# Patient Record
Sex: MALE | Race: WHITE | NOT HISPANIC OR LATINO
[De-identification: names, ages, dates, MRNs, and addresses within clinical notes are randomized per-mention and may not be internally consistent; named-entity substitution may affect disease eponyms.]

---

## 2017-03-13 ENCOUNTER — APPOINTMENT (OUTPATIENT)
Dept: INTERNAL MEDICINE | Facility: CLINIC | Age: 77
End: 2017-03-13

## 2017-08-01 ENCOUNTER — APPOINTMENT (OUTPATIENT)
Dept: INTERNAL MEDICINE | Facility: CLINIC | Age: 77
End: 2017-08-01
Payer: MEDICARE

## 2017-08-01 PROCEDURE — 99214 OFFICE O/P EST MOD 30 MIN: CPT

## 2017-08-24 ENCOUNTER — APPOINTMENT (OUTPATIENT)
Dept: GASTROENTEROLOGY | Facility: CLINIC | Age: 77
End: 2017-08-24
Payer: MEDICARE

## 2017-08-24 VITALS
RESPIRATION RATE: 16 BRPM | BODY MASS INDEX: 27.11 KG/M2 | OXYGEN SATURATION: 100 % | WEIGHT: 183 LBS | SYSTOLIC BLOOD PRESSURE: 129 MMHG | DIASTOLIC BLOOD PRESSURE: 79 MMHG | HEIGHT: 69 IN | HEART RATE: 62 BPM

## 2017-08-24 PROCEDURE — 99214 OFFICE O/P EST MOD 30 MIN: CPT

## 2017-09-01 ENCOUNTER — APPOINTMENT (OUTPATIENT)
Dept: INTERNAL MEDICINE | Facility: CLINIC | Age: 77
End: 2017-09-01
Payer: MEDICARE

## 2017-09-01 PROCEDURE — G0008: CPT

## 2017-09-01 PROCEDURE — 90686 IIV4 VACC NO PRSV 0.5 ML IM: CPT

## 2017-09-01 PROCEDURE — 99213 OFFICE O/P EST LOW 20 MIN: CPT | Mod: 25

## 2017-10-26 ENCOUNTER — APPOINTMENT (OUTPATIENT)
Dept: GASTROENTEROLOGY | Facility: GI CENTER | Age: 77
End: 2017-10-26

## 2017-12-19 ENCOUNTER — APPOINTMENT (OUTPATIENT)
Dept: INTERNAL MEDICINE | Facility: CLINIC | Age: 77
End: 2017-12-19
Payer: MEDICARE

## 2017-12-19 PROCEDURE — 99214 OFFICE O/P EST MOD 30 MIN: CPT | Mod: 25

## 2017-12-19 PROCEDURE — 36415 COLL VENOUS BLD VENIPUNCTURE: CPT

## 2018-03-14 ENCOUNTER — RX RENEWAL (OUTPATIENT)
Age: 78
End: 2018-03-14

## 2018-03-15 ENCOUNTER — APPOINTMENT (OUTPATIENT)
Dept: GASTROENTEROLOGY | Facility: GI CENTER | Age: 78
End: 2018-03-15
Payer: MEDICARE

## 2018-03-15 ENCOUNTER — OUTPATIENT (OUTPATIENT)
Dept: OUTPATIENT SERVICES | Facility: HOSPITAL | Age: 78
LOS: 1 days | Discharge: HOME | End: 2018-03-15
Payer: MEDICARE

## 2018-03-15 ENCOUNTER — RESULT REVIEW (OUTPATIENT)
Age: 78
End: 2018-03-15

## 2018-03-15 DIAGNOSIS — K50.10 CROHN'S DISEASE OF LARGE INTESTINE WITHOUT COMPLICATIONS: ICD-10-CM

## 2018-03-15 PROCEDURE — 88305 TISSUE EXAM BY PATHOLOGIST: CPT

## 2018-03-15 PROCEDURE — 88305 TISSUE EXAM BY PATHOLOGIST: CPT | Mod: 26

## 2018-03-15 PROCEDURE — 45380 COLONOSCOPY AND BIOPSY: CPT

## 2018-03-19 LAB — SURGICAL PATHOLOGY FINAL REPORT - CH: SIGNIFICANT CHANGE UP

## 2018-03-20 ENCOUNTER — RESULT REVIEW (OUTPATIENT)
Age: 78
End: 2018-03-20

## 2018-05-07 DIAGNOSIS — Z84.89 FAMILY HISTORY OF OTHER SPECIFIED CONDITIONS: ICD-10-CM

## 2018-05-07 DIAGNOSIS — Z87.891 PERSONAL HISTORY OF NICOTINE DEPENDENCE: ICD-10-CM

## 2018-05-07 DIAGNOSIS — Z78.9 OTHER SPECIFIED HEALTH STATUS: ICD-10-CM

## 2018-05-07 DIAGNOSIS — R42 DIZZINESS AND GIDDINESS: ICD-10-CM

## 2018-05-07 DIAGNOSIS — Z82.49 FAMILY HISTORY OF ISCHEMIC HEART DISEASE AND OTHER DISEASES OF THE CIRCULATORY SYSTEM: ICD-10-CM

## 2018-07-19 PROBLEM — Z78.9 CURRENT DRINKER OF ALCOHOL: Status: ACTIVE | Noted: 2018-07-19

## 2018-07-19 PROBLEM — Z84.89 FAMILY HISTORY OF PHLEBITIS: Status: ACTIVE | Noted: 2018-07-19

## 2018-07-19 PROBLEM — R42 VERTIGO: Status: ACTIVE | Noted: 2018-07-19

## 2018-07-19 PROBLEM — Z82.49 FAMILY HISTORY OF HYPERTENSION: Status: ACTIVE | Noted: 2018-07-19

## 2018-07-19 PROBLEM — Z87.891 FORMER SMOKER: Status: ACTIVE | Noted: 2018-07-19

## 2018-08-17 ENCOUNTER — LABORATORY RESULT (OUTPATIENT)
Age: 78
End: 2018-08-17

## 2018-08-17 ENCOUNTER — APPOINTMENT (OUTPATIENT)
Dept: INTERNAL MEDICINE | Facility: CLINIC | Age: 78
End: 2018-08-17
Payer: MEDICARE

## 2018-08-17 VITALS
BODY MASS INDEX: 30.21 KG/M2 | DIASTOLIC BLOOD PRESSURE: 60 MMHG | SYSTOLIC BLOOD PRESSURE: 140 MMHG | HEIGHT: 69 IN | WEIGHT: 204 LBS

## 2018-08-17 DIAGNOSIS — Z86.79 PERSONAL HISTORY OF OTHER DISEASES OF THE CIRCULATORY SYSTEM: ICD-10-CM

## 2018-08-17 DIAGNOSIS — Z86.39 PERSONAL HISTORY OF OTHER ENDOCRINE, NUTRITIONAL AND METABOLIC DISEASE: ICD-10-CM

## 2018-08-17 DIAGNOSIS — Z78.9 OTHER SPECIFIED HEALTH STATUS: ICD-10-CM

## 2018-08-17 PROCEDURE — 99214 OFFICE O/P EST MOD 30 MIN: CPT | Mod: 25

## 2018-08-17 PROCEDURE — 36415 COLL VENOUS BLD VENIPUNCTURE: CPT

## 2018-08-17 RX ORDER — POLYETHYLENE GLYOCOL 3350, SODIUM CHLORIDE, SODIUM BICARBONATE AND POTASSIUM CHLORIDE 420; 11.2; 5.72; 1.48 G/4L; G/4L; G/4L; G/4L
420 POWDER, FOR SOLUTION NASOGASTRIC; ORAL
Qty: 4000 | Refills: 0 | Status: DISCONTINUED | COMMUNITY
Start: 2018-03-14 | End: 2018-08-17

## 2018-08-17 RX ORDER — ASPIRIN 325 MG/1
325 TABLET, FILM COATED ORAL DAILY
Refills: 0 | Status: DISCONTINUED | COMMUNITY
End: 2018-08-17

## 2018-08-17 RX ORDER — SODIUM SULFATE, POTASSIUM SULFATE, MAGNESIUM SULFATE 17.5; 3.13; 1.6 G/ML; G/ML; G/ML
17.5-3.13-1.6 SOLUTION, CONCENTRATE ORAL
Qty: 1 | Refills: 0 | Status: DISCONTINUED | COMMUNITY
Start: 2017-08-24 | End: 2018-08-17

## 2018-08-17 NOTE — HISTORY OF PRESENT ILLNESS
[FreeTextEntry1] : check up on medical issues [de-identified] : Patient here for routine followup of his multiple medical problems. He has an indwelling monitor and had no obvious episodes A. fib remains on mellitus. He is concerned about his elevated TSH and vitamin D level which will be repeated.\par \par He has no complaints of chest pain shortness of breath or GI symptoms. He is compliant with his medication\par no complaints of chest pain

## 2018-08-17 NOTE — PLAN
[FreeTextEntry1] : Patient examined and adjustments to therapy for HBP not needed All labs reviewed with patient as well. Review of systems and physical exam discussed with patient. Care plan for future followups discussed with patient. All issues of health for the current year discussed in depth with patient who was conversant and all relevant issues addressed.\par A repeat TSH will be drawn to ensure that the patient does use thyroid. In addition vitamin D levels will be drawn and supplemented as he has a history of Crohn's and maybe malabsorb and period\par \par He is in normal sinus rhythm at the current time with normal blood pressure and normal physical exam.\par \par All issues regarding patient's health and medical problems have been discussed. The patient understands and concurs with the treatment plan.

## 2018-08-19 LAB
25(OH)D3 SERPL-MCNC: 25.3 NG/ML
ALBUMIN SERPL ELPH-MCNC: 4.5 G/DL
ALP BLD-CCNC: 47 U/L
ALT SERPL-CCNC: 45 U/L
ANION GAP SERPL CALC-SCNC: 12 MMOL/L
AST SERPL-CCNC: 32 U/L
BASOPHILS # BLD AUTO: 0.06 K/UL
BASOPHILS NFR BLD AUTO: 0.5 %
BILIRUB SERPL-MCNC: 0.4 MG/DL
BUN SERPL-MCNC: 26 MG/DL
CALCIUM SERPL-MCNC: 9.9 MG/DL
CHLORIDE SERPL-SCNC: 101 MMOL/L
CHOLEST SERPL-MCNC: 128 MG/DL
CHOLEST/HDLC SERPL: 3 RATIO
CO2 SERPL-SCNC: 27 MMOL/L
CREAT SERPL-MCNC: 1.07 MG/DL
EOSINOPHIL # BLD AUTO: 0.34 K/UL
EOSINOPHIL NFR BLD AUTO: 3.1 %
GLUCOSE SERPL-MCNC: 104 MG/DL
HCT VFR BLD CALC: 45.9 %
HDLC SERPL-MCNC: 43 MG/DL
HGB BLD-MCNC: 14.4 G/DL
IMM GRANULOCYTES NFR BLD AUTO: 0.6 %
LDLC SERPL CALC-MCNC: 51 MG/DL
LYMPHOCYTES # BLD AUTO: 3.02 K/UL
LYMPHOCYTES NFR BLD AUTO: 27.1 %
MAN DIFF?: NORMAL
MCHC RBC-ENTMCNC: 31 PG
MCHC RBC-ENTMCNC: 31.4 GM/DL
MCV RBC AUTO: 98.7 FL
MONOCYTES # BLD AUTO: 1.02 K/UL
MONOCYTES NFR BLD AUTO: 9.2 %
NEUTROPHILS # BLD AUTO: 6.62 K/UL
NEUTROPHILS NFR BLD AUTO: 59.5 %
PLATELET # BLD AUTO: 277 K/UL
POTASSIUM SERPL-SCNC: 5.3 MMOL/L
PROT SERPL-MCNC: 7.1 G/DL
RBC # BLD: 4.65 M/UL
RBC # FLD: 15.4 %
SODIUM SERPL-SCNC: 140 MMOL/L
T4 SERPL-MCNC: 8 UG/DL
TRIGL SERPL-MCNC: 171 MG/DL
TSH SERPL-ACNC: 5.15 UIU/ML
WBC # FLD AUTO: 11.13 K/UL

## 2018-08-20 ENCOUNTER — TRANSCRIPTION ENCOUNTER (OUTPATIENT)
Age: 78
End: 2018-08-20

## 2018-08-23 ENCOUNTER — TRANSCRIPTION ENCOUNTER (OUTPATIENT)
Age: 78
End: 2018-08-23

## 2018-11-12 ENCOUNTER — APPOINTMENT (OUTPATIENT)
Dept: INTERNAL MEDICINE | Facility: CLINIC | Age: 78
End: 2018-11-12
Payer: MEDICARE

## 2018-11-12 VITALS
SYSTOLIC BLOOD PRESSURE: 105 MMHG | HEIGHT: 69 IN | DIASTOLIC BLOOD PRESSURE: 55 MMHG | WEIGHT: 201 LBS | BODY MASS INDEX: 29.77 KG/M2

## 2018-11-12 DIAGNOSIS — I47.1 SUPRAVENTRICULAR TACHYCARDIA: ICD-10-CM

## 2018-11-12 DIAGNOSIS — R00.2 PALPITATIONS: ICD-10-CM

## 2018-11-12 PROCEDURE — 90662 IIV NO PRSV INCREASED AG IM: CPT

## 2018-11-12 PROCEDURE — 36415 COLL VENOUS BLD VENIPUNCTURE: CPT

## 2018-11-12 PROCEDURE — G0008: CPT

## 2018-11-12 PROCEDURE — 99214 OFFICE O/P EST MOD 30 MIN: CPT | Mod: 25

## 2018-11-12 NOTE — PLAN
[FreeTextEntry1] : Patient examined and adjustments to therapy for HBP not needed and 115/70 today with a heart rate of 55 All labs reviewed with patient as well. Review of systems and physical exam discussed with patient. Care plan for future followups discussed with patient. All issues of health for the current year discussed in depth with patient who was conversant and all relevant issues addressed.\par \par Patient concern regarding varying heart rates down to upper 40s at rest and I reassured him that normal sinus rhythm and no issues to be concerned about.\par \par Cholesterol levels discussed with patient. Compliance with oral medication were also addressed . Ideal LDL levels were  discussed with the patient. All issues regarding the implications of high cholesterol necessity for treatment were discussed with the patient who is conversant and all relevant questions were asked and answered. Last LDL was low. Recent labs referenced above and reviewed are all within normal limits and no labs to be drawn today.\par \par Patient received Fluzone high-dose vaccination. All issues discussed with the patient he'll followup in 6 months for wellness.\par \par All issues regarding patient's health and medical problems have been discussed. The patient understands and concurs with the treatment plan.

## 2018-11-12 NOTE — HISTORY OF PRESENT ILLNESS
[FreeTextEntry1] : check up on medical issues [de-identified] : Patient here for routine followup on his multiple medical problems. In addition he recently had an episode of acute bronchitis several ago with transient lowering of his pulse ox. It had returned to normal by the morning as been asymptomatic. He does have a diagnosis of COPD and has inhalers at home as needed.\par

## 2018-12-05 ENCOUNTER — MEDICATION RENEWAL (OUTPATIENT)
Age: 78
End: 2018-12-05

## 2019-06-03 ENCOUNTER — TRANSCRIPTION ENCOUNTER (OUTPATIENT)
Age: 79
End: 2019-06-03

## 2019-06-03 ENCOUNTER — APPOINTMENT (OUTPATIENT)
Dept: INTERNAL MEDICINE | Facility: CLINIC | Age: 79
End: 2019-06-03
Payer: MEDICARE

## 2019-06-03 VITALS
SYSTOLIC BLOOD PRESSURE: 125 MMHG | DIASTOLIC BLOOD PRESSURE: 60 MMHG | HEIGHT: 69 IN | WEIGHT: 195 LBS | BODY MASS INDEX: 28.88 KG/M2

## 2019-06-03 PROCEDURE — 99214 OFFICE O/P EST MOD 30 MIN: CPT | Mod: 25

## 2019-06-03 PROCEDURE — 36415 COLL VENOUS BLD VENIPUNCTURE: CPT

## 2019-06-03 RX ORDER — LISINOPRIL 20 MG/1
20 TABLET ORAL DAILY
Refills: 0 | Status: DISCONTINUED | COMMUNITY
End: 2019-06-03

## 2019-06-03 RX ORDER — NEBIVOLOL HYDROCHLORIDE 10 MG/1
10 TABLET ORAL DAILY
Refills: 0 | Status: DISCONTINUED | COMMUNITY
End: 2019-06-03

## 2019-06-03 NOTE — PHYSICAL EXAM
[No Acute Distress] : no acute distress [Well Nourished] : well nourished [Well-Appearing] : well-appearing [Well Developed] : well developed [Normal Sclera/Conjunctiva] : normal sclera/conjunctiva [EOMI] : extraocular movements intact [PERRL] : pupils equal round and reactive to light [Normal Outer Ear/Nose] : the outer ears and nose were normal in appearance [Normal Oropharynx] : the oropharynx was normal [No JVD] : no jugular venous distention [No Lymphadenopathy] : no lymphadenopathy [Supple] : supple [Thyroid Normal, No Nodules] : the thyroid was normal and there were no nodules present [No Respiratory Distress] : no respiratory distress  [Clear to Auscultation] : lungs were clear to auscultation bilaterally [No Accessory Muscle Use] : no accessory muscle use [Normal Rate] : normal rate  [Normal S1, S2] : normal S1 and S2 [Regular Rhythm] : with a regular rhythm [No Murmur] : no murmur heard [No Abdominal Bruit] : a ~M bruit was not heard ~T in the abdomen [No Carotid Bruits] : no carotid bruits [Pedal Pulses Present] : the pedal pulses are present [No Varicosities] : no varicosities [No Palpable Aorta] : no palpable aorta [No Extremity Clubbing/Cyanosis] : no extremity clubbing/cyanosis [No Edema] : there was no peripheral edema [Soft] : abdomen soft [Non Tender] : non-tender [Non-distended] : non-distended [No Masses] : no abdominal mass palpated [No HSM] : no HSM [Normal Bowel Sounds] : normal bowel sounds [Normal Posterior Cervical Nodes] : no posterior cervical lymphadenopathy [Normal Anterior Cervical Nodes] : no anterior cervical lymphadenopathy [No CVA Tenderness] : no CVA  tenderness [No Spinal Tenderness] : no spinal tenderness [No Joint Swelling] : no joint swelling [Grossly Normal Strength/Tone] : grossly normal strength/tone [No Rash] : no rash [Normal Gait] : normal gait [Coordination Grossly Intact] : coordination grossly intact [No Focal Deficits] : no focal deficits [Normal Affect] : the affect was normal [Deep Tendon Reflexes (DTR)] : deep tendon reflexes were 2+ and symmetric [Normal Insight/Judgement] : insight and judgment were intact [de-identified] : NSR

## 2019-06-03 NOTE — HISTORY OF PRESENT ILLNESS
[FreeTextEntry1] : FOLLOW UP [de-identified] :  followup of multiple medical problems. Since last visit he had a bout of atrial fibrillation with a heart rate of 170 picked up by both the patient and then by his implantable loop recorder. He required cardioversion and is now on flecainide. Patient is compliant with his medications and is aware of the finding of A. fib in the anticoagulation to if. Has no complaints of flare of his ulcerative colitis and is otherwise feeling well

## 2019-06-03 NOTE — PLAN
[FreeTextEntry1] : Patient has a [id number] score 3 and will require anticoagulation lifetime\par \par Patient examined and adjustments to therapy for HBP not require All labs reviewed with patient as well. Review of systems and physical exam discussed with patient. Care plan for future followups discussed with patient. All issues of health for the current year discussed in depth with patient who was conversant and all relevant issues addressed.\par \par Patient has no known coronary artery disease and will continue cholesterol lowering agents.Cholesterol levels discussed with patient. Compliance with oral medication were also addressed . Ideal LDL levels were  discussed with the patient. All issues regarding the implications of high cholesterol necessity for treatment were discussed with the patient who is conversant and all relevant questions were asked and answered.\par \par \par A long conversation with patient regarding the need for lifelong anticoagulation. I expressed my concerns regarding flexion I expressly told him he must avoid all medications that interact specifically quinolones\par \par All issues regarding patient's health and medical problems have been discussed. The patient understands and concurs with the treatment plan.

## 2019-06-04 LAB
ALBUMIN SERPL ELPH-MCNC: 4.4 G/DL
ALP BLD-CCNC: 57 U/L
ALT SERPL-CCNC: 40 U/L
ANION GAP SERPL CALC-SCNC: 10 MMOL/L
AST SERPL-CCNC: 39 U/L
BILIRUB SERPL-MCNC: 0.3 MG/DL
BUN SERPL-MCNC: 27 MG/DL
CALCIUM SERPL-MCNC: 9.8 MG/DL
CHLORIDE SERPL-SCNC: 104 MMOL/L
CHOLEST SERPL-MCNC: 111 MG/DL
CHOLEST/HDLC SERPL: 3.4 RATIO
CO2 SERPL-SCNC: 24 MMOL/L
CREAT SERPL-MCNC: 1.16 MG/DL
GLUCOSE SERPL-MCNC: 114 MG/DL
HDLC SERPL-MCNC: 33 MG/DL
LDLC SERPL CALC-MCNC: 39 MG/DL
POTASSIUM SERPL-SCNC: 4.9 MMOL/L
PROT SERPL-MCNC: 6.8 G/DL
SODIUM SERPL-SCNC: 138 MMOL/L
TRIGL SERPL-MCNC: 197 MG/DL
TSH SERPL-ACNC: 3.79 UIU/ML

## 2019-12-05 ENCOUNTER — APPOINTMENT (OUTPATIENT)
Dept: INTERNAL MEDICINE | Facility: CLINIC | Age: 79
End: 2019-12-05
Payer: MEDICARE

## 2019-12-05 VITALS
HEIGHT: 69 IN | DIASTOLIC BLOOD PRESSURE: 60 MMHG | SYSTOLIC BLOOD PRESSURE: 115 MMHG | WEIGHT: 189 LBS | BODY MASS INDEX: 27.99 KG/M2

## 2019-12-05 PROCEDURE — 90686 IIV4 VACC NO PRSV 0.5 ML IM: CPT

## 2019-12-05 PROCEDURE — G0008: CPT

## 2019-12-05 PROCEDURE — G0439: CPT

## 2019-12-05 PROCEDURE — G0444 DEPRESSION SCREEN ANNUAL: CPT | Mod: 59

## 2019-12-05 NOTE — REASON FOR VISIT
[Annual Wellness Visit] : an annual wellness visit [FreeTextEntry1] : also flare x 24 hrs crohns with possible resolved sbo

## 2019-12-05 NOTE — HISTORY OF PRESENT ILLNESS
[FreeTextEntry1] : wellness\par fup on med issues [de-identified] : The patient in followup wellness and his medical problems which include paroxysmal atrial fibrillation and hypertension. He has no evidence of hyperlipidemia in no cardiac disease. He is compliant with all his medications and up-to-date with vaccines. He had an attack of what he feels is just patient's Crohn's for 24 hours self treated with prednisone with improvement

## 2019-12-05 NOTE — PHYSICAL EXAM
[No Acute Distress] : no acute distress [Well Nourished] : well nourished [Well Developed] : well developed [Well-Appearing] : well-appearing [Normal Sclera/Conjunctiva] : normal sclera/conjunctiva [PERRL] : pupils equal round and reactive to light [EOMI] : extraocular movements intact [Normal Outer Ear/Nose] : the outer ears and nose were normal in appearance [Normal Oropharynx] : the oropharynx was normal [No JVD] : no jugular venous distention [No Lymphadenopathy] : no lymphadenopathy [Supple] : supple [Thyroid Normal, No Nodules] : the thyroid was normal and there were no nodules present [No Accessory Muscle Use] : no accessory muscle use [No Respiratory Distress] : no respiratory distress  [Clear to Auscultation] : lungs were clear to auscultation bilaterally [Regular Rhythm] : with a regular rhythm [Normal Rate] : normal rate  [No Murmur] : no murmur heard [Normal S1, S2] : normal S1 and S2 [No Carotid Bruits] : no carotid bruits [No Abdominal Bruit] : a ~M bruit was not heard ~T in the abdomen [No Varicosities] : no varicosities [Pedal Pulses Present] : the pedal pulses are present [No Edema] : there was no peripheral edema [No Extremity Clubbing/Cyanosis] : no extremity clubbing/cyanosis [No Palpable Aorta] : no palpable aorta [Soft] : abdomen soft [Non Tender] : non-tender [Non-distended] : non-distended [No Masses] : no abdominal mass palpated [No HSM] : no HSM [Normal Bowel Sounds] : normal bowel sounds [Normal Posterior Cervical Nodes] : no posterior cervical lymphadenopathy [Normal Anterior Cervical Nodes] : no anterior cervical lymphadenopathy [No CVA Tenderness] : no CVA  tenderness [No Spinal Tenderness] : no spinal tenderness [No Joint Swelling] : no joint swelling [Grossly Normal Strength/Tone] : grossly normal strength/tone [No Rash] : no rash [Coordination Grossly Intact] : coordination grossly intact [No Focal Deficits] : no focal deficits [Normal Gait] : normal gait [Deep Tendon Reflexes (DTR)] : deep tendon reflexes were 2+ and symmetric [Normal Affect] : the affect was normal [Normal Insight/Judgement] : insight and judgment were intact

## 2019-12-05 NOTE — HEALTH RISK ASSESSMENT
[Excellent] : ~his/her~  mood as  excellent [No] : No [No falls in past year] : Patient reported no falls in the past year [0] : 2) Feeling down, depressed, or hopeless: Not at all (0) [Patient reported colonoscopy was normal] : Patient reported colonoscopy was normal [HIV test declined] : HIV test declined [Hepatitis C test declined] : Hepatitis C test declined [None] : None [Alone] : lives alone [Fully functional (bathing, dressing, toileting, transferring, walking, feeding)] : Fully functional (bathing, dressing, toileting, transferring, walking, feeding) [Fully functional (using the telephone, shopping, preparing meals, housekeeping, doing laundry, using] : Fully functional and needs no help or supervision to perform IADLs (using the telephone, shopping, preparing meals, housekeeping, doing laundry, using transportation, managing medications and managing finances) [] : No [Language] : denies difficulty with language [Change in mental status noted] : No change in mental status noted [ColonoscopyDate] : 05/01/2018

## 2019-12-05 NOTE — ASSESSMENT
[FreeTextEntry1] : Wellness exam completed. All issues of health and screening up to date. Immunizations and screening reviewed with patient. All issues of health for the current year discussed in depth with patient. Patient was conversant during the exam and all pertinent issues addressed. Depression screen is 0in chart. Fall prevention was addressed.\par \par Patient examined and adjustments to therapy for HBP not required All labs reviewed with patient as well. Review of systems and physical exam discussed with patient. Care plan for future followups discussed with patient. All issues of health for the current year discussed in depth with patient who was conversant and all relevant issues addressed.\par \par Patient under the care cardiology for paroxysmal atrial fibrillation and is maintained on Anamaria Pito. EKG is done through them. He is in [employer] Loop recorder with a rare episode of A. fib. He is asymptomatic he understands the need to stay on Anamaria Tin lifelong\par \par All issues regarding patient's health and medical problems have been discussed. The patient understands and concurs with the treatment plan.

## 2019-12-08 ENCOUNTER — TRANSCRIPTION ENCOUNTER (OUTPATIENT)
Age: 79
End: 2019-12-08

## 2019-12-08 LAB
25(OH)D3 SERPL-MCNC: 22.5 NG/ML
ALBUMIN SERPL ELPH-MCNC: 4.5 G/DL
ALP BLD-CCNC: 67 U/L
ALT SERPL-CCNC: 35 U/L
ANION GAP SERPL CALC-SCNC: 13 MMOL/L
APPEARANCE: CLEAR
AST SERPL-CCNC: 29 U/L
BACTERIA: NEGATIVE
BASOPHILS # BLD AUTO: 0.05 K/UL
BASOPHILS NFR BLD AUTO: 0.4 %
BILIRUB SERPL-MCNC: 0.5 MG/DL
BILIRUBIN URINE: NEGATIVE
BLOOD URINE: NEGATIVE
BUN SERPL-MCNC: 27 MG/DL
CALCIUM SERPL-MCNC: 10 MG/DL
CHLORIDE SERPL-SCNC: 102 MMOL/L
CHOLEST SERPL-MCNC: 139 MG/DL
CHOLEST/HDLC SERPL: 2.1 RATIO
CO2 SERPL-SCNC: 24 MMOL/L
COLOR: YELLOW
CREAT SERPL-MCNC: 1 MG/DL
EOSINOPHIL # BLD AUTO: 0.01 K/UL
EOSINOPHIL NFR BLD AUTO: 0.1 %
GLUCOSE QUALITATIVE U: NEGATIVE
GLUCOSE SERPL-MCNC: 133 MG/DL
HCT VFR BLD CALC: 46.5 %
HDLC SERPL-MCNC: 65 MG/DL
HGB BLD-MCNC: 14.9 G/DL
HYALINE CASTS: 0 /LPF
IMM GRANULOCYTES NFR BLD AUTO: 0.4 %
KETONES URINE: NEGATIVE
LDLC SERPL CALC-MCNC: 64 MG/DL
LEUKOCYTE ESTERASE URINE: NEGATIVE
LYMPHOCYTES # BLD AUTO: 1.46 K/UL
LYMPHOCYTES NFR BLD AUTO: 11.2 %
MAN DIFF?: NORMAL
MCHC RBC-ENTMCNC: 31.6 PG
MCHC RBC-ENTMCNC: 32 GM/DL
MCV RBC AUTO: 98.5 FL
MICROSCOPIC-UA: NORMAL
MONOCYTES # BLD AUTO: 0.44 K/UL
MONOCYTES NFR BLD AUTO: 3.4 %
NEUTROPHILS # BLD AUTO: 11 K/UL
NEUTROPHILS NFR BLD AUTO: 84.5 %
NITRITE URINE: NEGATIVE
PH URINE: 5
PLATELET # BLD AUTO: 304 K/UL
POTASSIUM SERPL-SCNC: 5.2 MMOL/L
PROT SERPL-MCNC: 7.2 G/DL
PROTEIN URINE: NEGATIVE
RBC # BLD: 4.72 M/UL
RBC # FLD: 14.7 %
RED BLOOD CELLS URINE: 1 /HPF
SODIUM SERPL-SCNC: 139 MMOL/L
SPECIFIC GRAVITY URINE: 1.02
SQUAMOUS EPITHELIAL CELLS: 0 /HPF
TRIGL SERPL-MCNC: 51 MG/DL
TSH SERPL-ACNC: 1.45 UIU/ML
UROBILINOGEN URINE: NORMAL
WBC # FLD AUTO: 13.01 K/UL
WHITE BLOOD CELLS URINE: 0 /HPF

## 2020-01-03 ENCOUNTER — MEDICATION RENEWAL (OUTPATIENT)
Age: 80
End: 2020-01-03

## 2020-01-05 ENCOUNTER — MEDICATION RENEWAL (OUTPATIENT)
Age: 80
End: 2020-01-05

## 2020-03-18 ENCOUNTER — TRANSCRIPTION ENCOUNTER (OUTPATIENT)
Age: 80
End: 2020-03-18

## 2020-06-11 ENCOUNTER — APPOINTMENT (OUTPATIENT)
Dept: INTERNAL MEDICINE | Facility: CLINIC | Age: 80
End: 2020-06-11
Payer: MEDICARE

## 2020-06-11 VITALS
HEIGHT: 69 IN | BODY MASS INDEX: 27.4 KG/M2 | DIASTOLIC BLOOD PRESSURE: 60 MMHG | WEIGHT: 185 LBS | SYSTOLIC BLOOD PRESSURE: 145 MMHG | TEMPERATURE: 97.2 F

## 2020-06-11 DIAGNOSIS — Z23 ENCOUNTER FOR IMMUNIZATION: ICD-10-CM

## 2020-06-11 PROCEDURE — 99214 OFFICE O/P EST MOD 30 MIN: CPT | Mod: 25

## 2020-06-11 PROCEDURE — 36415 COLL VENOUS BLD VENIPUNCTURE: CPT

## 2020-06-11 PROCEDURE — 90715 TDAP VACCINE 7 YRS/> IM: CPT | Mod: GY

## 2020-06-11 PROCEDURE — 90471 IMMUNIZATION ADMIN: CPT

## 2020-06-11 NOTE — HISTORY OF PRESENT ILLNESS
[FreeTextEntry1] : Patient here in follow up to last visit and prior issue\par  [de-identified] : Patient here for routine followup to his multiple medical problems. He is himself isolating home with slight increase in depression. He is compliant with all his medications. He has no active Crohn's disease and no complaints of any neurological is

## 2020-06-11 NOTE — PHYSICAL EXAM
[No Acute Distress] : no acute distress [Well Nourished] : well nourished [Well Developed] : well developed [Well-Appearing] : well-appearing [Normal Sclera/Conjunctiva] : normal sclera/conjunctiva [PERRL] : pupils equal round and reactive to light [Normal Outer Ear/Nose] : the outer ears and nose were normal in appearance [EOMI] : extraocular movements intact [Normal Oropharynx] : the oropharynx was normal [No JVD] : no jugular venous distention [No Lymphadenopathy] : no lymphadenopathy [Supple] : supple [Thyroid Normal, No Nodules] : the thyroid was normal and there were no nodules present [No Respiratory Distress] : no respiratory distress  [No Accessory Muscle Use] : no accessory muscle use [Clear to Auscultation] : lungs were clear to auscultation bilaterally [Normal Rate] : normal rate  [Regular Rhythm] : with a regular rhythm [No Murmur] : no murmur heard [Normal S1, S2] : normal S1 and S2 [No Carotid Bruits] : no carotid bruits [No Abdominal Bruit] : a ~M bruit was not heard ~T in the abdomen [No Varicosities] : no varicosities [No Edema] : there was no peripheral edema [Pedal Pulses Present] : the pedal pulses are present [No Extremity Clubbing/Cyanosis] : no extremity clubbing/cyanosis [Soft] : abdomen soft [No Palpable Aorta] : no palpable aorta [Non Tender] : non-tender [Non-distended] : non-distended [No Masses] : no abdominal mass palpated [No HSM] : no HSM [Normal Bowel Sounds] : normal bowel sounds [Normal Posterior Cervical Nodes] : no posterior cervical lymphadenopathy [Normal Anterior Cervical Nodes] : no anterior cervical lymphadenopathy [No CVA Tenderness] : no CVA  tenderness [No Spinal Tenderness] : no spinal tenderness [No Joint Swelling] : no joint swelling [Grossly Normal Strength/Tone] : grossly normal strength/tone [No Focal Deficits] : no focal deficits [Coordination Grossly Intact] : coordination grossly intact [No Rash] : no rash [Deep Tendon Reflexes (DTR)] : deep tendon reflexes were 2+ and symmetric [Normal Gait] : normal gait [Normal Insight/Judgement] : insight and judgment were intact [Normal Affect] : the affect was normal

## 2020-06-11 NOTE — ASSESSMENT
[FreeTextEntry1] : Patient examined and adjustments to therapy for HBP DOES not need adjusting All labs reviewed with patient as well. Review of systems and physical exam discussed with patient. Care plan for future followups discussed with patient. All issues of health for the current year discussed in depth with patient who was conversant and all relevant issues addressed.\par \par Cholesterol levels discussed with patient. Compliance with oral medication were also addressed . Ideal LDL levels were  discussed with the patient. All issues regarding the implications of high cholesterol necessity for treatment were discussed with the patient who is conversant and all relevant questions were asked and answered.\par \par Patient also will be screened for vitamin D level as he has Crohn's to ensure adequate levels. And thyroid screening will also be done with his history of Hashimoto's\par \par Review immunization reveals patient needs tetanus diphtheria pertussis which was given today after consent\par \par All issues regarding patient's health and medical problems have been discussed. The patient understands and concurs with the treatment plan.

## 2020-06-14 ENCOUNTER — TRANSCRIPTION ENCOUNTER (OUTPATIENT)
Age: 80
End: 2020-06-14

## 2020-06-14 LAB
25(OH)D3 SERPL-MCNC: 27.2 NG/ML
ANION GAP SERPL CALC-SCNC: 11 MMOL/L
BUN SERPL-MCNC: 29 MG/DL
CALCIUM SERPL-MCNC: 9.9 MG/DL
CHLORIDE SERPL-SCNC: 101 MMOL/L
CHOLEST SERPL-MCNC: 118 MG/DL
CHOLEST/HDLC SERPL: 2.7 RATIO
CO2 SERPL-SCNC: 24 MMOL/L
CREAT SERPL-MCNC: 1.07 MG/DL
ESTIMATED AVERAGE GLUCOSE: 123 MG/DL
GLUCOSE SERPL-MCNC: 106 MG/DL
HBA1C MFR BLD HPLC: 5.9 %
HDLC SERPL-MCNC: 43 MG/DL
LDLC SERPL CALC-MCNC: 46 MG/DL
POTASSIUM SERPL-SCNC: 4.7 MMOL/L
SODIUM SERPL-SCNC: 137 MMOL/L
TRIGL SERPL-MCNC: 143 MG/DL
TSH SERPL-ACNC: 2.04 UIU/ML

## 2020-09-17 ENCOUNTER — TRANSCRIPTION ENCOUNTER (OUTPATIENT)
Age: 80
End: 2020-09-17

## 2021-04-28 ENCOUNTER — TRANSCRIPTION ENCOUNTER (OUTPATIENT)
Age: 81
End: 2021-04-28

## 2021-05-27 ENCOUNTER — APPOINTMENT (OUTPATIENT)
Dept: INTERNAL MEDICINE | Facility: CLINIC | Age: 81
End: 2021-05-27
Payer: MEDICARE

## 2021-05-27 VITALS
WEIGHT: 172 LBS | TEMPERATURE: 97.2 F | HEIGHT: 69 IN | DIASTOLIC BLOOD PRESSURE: 60 MMHG | BODY MASS INDEX: 25.48 KG/M2 | SYSTOLIC BLOOD PRESSURE: 120 MMHG

## 2021-05-27 DIAGNOSIS — Z12.5 ENCOUNTER FOR SCREENING FOR MALIGNANT NEOPLASM OF PROSTATE: ICD-10-CM

## 2021-05-27 PROCEDURE — 36415 COLL VENOUS BLD VENIPUNCTURE: CPT

## 2021-05-27 PROCEDURE — 99215 OFFICE O/P EST HI 40 MIN: CPT | Mod: 25

## 2021-05-27 RX ORDER — FLECAINIDE ACETATE 50 MG/1
50 TABLET ORAL TWICE DAILY
Refills: 0 | Status: DISCONTINUED | COMMUNITY
End: 2021-05-27

## 2021-05-27 RX ORDER — LISINOPRIL AND HYDROCHLOROTHIAZIDE TABLETS 20; 12.5 MG/1; MG/1
20-12.5 TABLET ORAL DAILY
Refills: 0 | Status: DISCONTINUED | COMMUNITY
End: 2021-05-27

## 2021-05-27 NOTE — HISTORY OF PRESENT ILLNESS
[FreeTextEntry1] : Patient here in follow up to last visit and prior issue\par  [de-identified] : Patient here for routine followup to his multiple medical problems. He is himself isolating home with slight increase in depression. He is compliant with all his medications. He has no active Crohn's disease and no complaints of any neurological \par \par H/O afib ablation due to AFIB chronic\par Patient here for routine follow-up.  He is doing well after his A. fib ablation for persistent A. fib.  He has been compliant with medications and has no further complaints of chest pain or shortness of breath or palpitations\par \par \par

## 2021-05-27 NOTE — ASSESSMENT
[FreeTextEntry1] : Patient examined and adjustments to therapy for HBP not needed all labs reviewed with patient as well. Review of systems and physical exam discussed with patient. Care plan for future followups discussed with patient. All issues of health for the current year discussed in depth with patient who was conversant and all relevant issues addressed.\par \par Cholesterol levels discussed with patient. Compliance with oral medication were also addressed . Ideal LDL levels were  discussed with the patient. All issues regarding the implications of high cholesterol necessity for treatment were discussed with the patient who is conversant and all relevant questions were asked and answered.  Labs have been on target and repeated\par \par Patient with history of low vitamin D and will be rechecked in a low will be changed to 50,000 units weekly\par At patient's request PSA testing was done\par \par Patient status post ablation and is aware that he will require lifelong anticoagulation due to continued risk for stroke\par \par All issues regarding patient's health and medical problems have been discussed. The patient understands and concurs with the treatment plan.\par This was an extended visit lasting 45minutes, including review of prior notes laboratory data and examination and discussing with patient including completion of current note.\par \par

## 2021-05-31 ENCOUNTER — TRANSCRIPTION ENCOUNTER (OUTPATIENT)
Age: 81
End: 2021-05-31

## 2021-05-31 LAB
25(OH)D3 SERPL-MCNC: 33.1 NG/ML
BASOPHILS # BLD AUTO: 0.1 K/UL
BASOPHILS NFR BLD AUTO: 1 %
CHOLEST SERPL-MCNC: 107 MG/DL
EOSINOPHIL # BLD AUTO: 0.35 K/UL
EOSINOPHIL NFR BLD AUTO: 3.5 %
HCT VFR BLD CALC: 46.3 %
HDLC SERPL-MCNC: 45 MG/DL
HGB BLD-MCNC: 14.2 G/DL
IMM GRANULOCYTES NFR BLD AUTO: 0.4 %
LDLC SERPL CALC-MCNC: 50 MG/DL
LYMPHOCYTES # BLD AUTO: 3.05 K/UL
LYMPHOCYTES NFR BLD AUTO: 30.5 %
MAN DIFF?: NORMAL
MCHC RBC-ENTMCNC: 29.4 PG
MCHC RBC-ENTMCNC: 30.7 GM/DL
MCV RBC AUTO: 95.9 FL
MONOCYTES # BLD AUTO: 1.4 K/UL
MONOCYTES NFR BLD AUTO: 14 %
NEUTROPHILS # BLD AUTO: 5.07 K/UL
NEUTROPHILS NFR BLD AUTO: 50.6 %
NONHDLC SERPL-MCNC: 62 MG/DL
PLATELET # BLD AUTO: 225 K/UL
PSA SERPL-MCNC: 0.75 NG/ML
RBC # BLD: 4.83 M/UL
RBC # FLD: 15 %
TRIGL SERPL-MCNC: 61 MG/DL
WBC # FLD AUTO: 10.01 K/UL

## 2021-09-23 ENCOUNTER — TRANSCRIPTION ENCOUNTER (OUTPATIENT)
Age: 81
End: 2021-09-23

## 2022-04-04 ENCOUNTER — TRANSCRIPTION ENCOUNTER (OUTPATIENT)
Age: 82
End: 2022-04-04

## 2022-06-15 ENCOUNTER — APPOINTMENT (OUTPATIENT)
Dept: INTERNAL MEDICINE | Facility: CLINIC | Age: 82
End: 2022-06-15
Payer: MEDICARE

## 2022-06-15 VITALS
BODY MASS INDEX: 26.66 KG/M2 | HEIGHT: 69 IN | DIASTOLIC BLOOD PRESSURE: 62 MMHG | SYSTOLIC BLOOD PRESSURE: 150 MMHG | WEIGHT: 180 LBS

## 2022-06-15 PROCEDURE — G0444 DEPRESSION SCREEN ANNUAL: CPT | Mod: 59

## 2022-06-15 PROCEDURE — 99214 OFFICE O/P EST MOD 30 MIN: CPT | Mod: 25

## 2022-06-15 PROCEDURE — G0439: CPT

## 2022-06-15 PROCEDURE — 36415 COLL VENOUS BLD VENIPUNCTURE: CPT

## 2022-06-15 NOTE — HISTORY OF PRESENT ILLNESS
[FreeTextEntry1] : Patient here for evaluation of multiple medical problems and new issues to be discussed\par wellness [de-identified] : Patient multiple medical problems under good control.  Hypertension cholesterol without difficulty.  He has paroxysmal atrial fibrillation with one recently that reverted spontaneously.  He is under the care of EPS physician.  Had an ablation done previously and is on chronic anticoagulation\par He is received COVID x3 only and is 2 years overdue for colonoscopy due to his underlying Crohn's.  He otherwise feels well with no chest pain shortness of breath he has no neurological complaints and feels overall well

## 2022-06-15 NOTE — REVIEW OF SYSTEMS
[Palpitations] : palpitations [Negative] : Heme/Lymph [Abdominal Pain] : no abdominal pain [Constipation] : no constipation [FreeTextEntry5] : one episode afib 24 hours and reverted spont [FreeTextEntry7] : stable crohns - needs colonoscopy

## 2022-06-15 NOTE — ASSESSMENT
[FreeTextEntry1] : Patient examined and adjustments to therapy for HBP not needed not needed all labs reviewed with patient as well. Review of systems and physical exam discussed with patient. Care plan for future followups discussed with patient. All issues of health for the current year discussed in depth with patient who was conversant and all relevant issues addressed.\par Cholesterol levels discussed with patient. Compliance with oral medication were also addressed . Ideal LDL levels were  discussed with the patient. All issues regarding the implications of high cholesterol necessity for treatment were discussed with the patient who is conversant and all relevant questions were asked and answered.\par Patient referred to GI for follow-up colonoscopy\par Recommended fourth booster next several months\par Wellness exam completed. All issues of health and screening up to date. Immunizations and screening reviewed with patient. All issues of health for the current year discussed in depth with patient. Patient was conversant during the exam and all pertinent issues addressed. Depression screen 0 in chart. Fall prevention was addressed.\par Overall doing well issues of health discussed patient will follow up in 6 months

## 2022-06-15 NOTE — HEALTH RISK ASSESSMENT
[Excellent] : ~his/her~  mood as  excellent [Former] : Former [Patient reported colonoscopy was abnormal] : Patient reported colonoscopy was abnormal [No falls in past year] : Patient reported no falls in the past year [0] : 2) Feeling down, depressed, or hopeless: Not at all (0) [PHQ-2 Negative - No further assessment needed] : PHQ-2 Negative - No further assessment needed [[id number]] : 0 [ColonoscopyDate] : 2018 [ColonoscopyComments] : refuses

## 2022-06-16 ENCOUNTER — TRANSCRIPTION ENCOUNTER (OUTPATIENT)
Age: 82
End: 2022-06-16

## 2022-06-16 LAB
25(OH)D3 SERPL-MCNC: 35.6 NG/ML
ALBUMIN SERPL ELPH-MCNC: 4.7 G/DL
ALP BLD-CCNC: 56 U/L
ALT SERPL-CCNC: 66 U/L
ANION GAP SERPL CALC-SCNC: 14 MMOL/L
APPEARANCE: CLEAR
AST SERPL-CCNC: 52 U/L
BACTERIA: NEGATIVE
BASOPHILS # BLD AUTO: 0.1 K/UL
BASOPHILS NFR BLD AUTO: 0.8 %
BILIRUB SERPL-MCNC: 0.7 MG/DL
BILIRUBIN URINE: NEGATIVE
BLOOD URINE: NEGATIVE
BUN SERPL-MCNC: 34 MG/DL
CALCIUM SERPL-MCNC: 10.3 MG/DL
CHLORIDE SERPL-SCNC: 102 MMOL/L
CHOLEST SERPL-MCNC: 120 MG/DL
CO2 SERPL-SCNC: 25 MMOL/L
COLOR: YELLOW
CREAT SERPL-MCNC: 1.24 MG/DL
EGFR: 59 ML/MIN/1.73M2
EOSINOPHIL # BLD AUTO: 0.14 K/UL
EOSINOPHIL NFR BLD AUTO: 1.1 %
GLUCOSE QUALITATIVE U: NEGATIVE
GLUCOSE SERPL-MCNC: 97 MG/DL
HCT VFR BLD CALC: 44 %
HDLC SERPL-MCNC: 48 MG/DL
HGB BLD-MCNC: 14 G/DL
HYALINE CASTS: 0 /LPF
IMM GRANULOCYTES NFR BLD AUTO: 0.5 %
KETONES URINE: NEGATIVE
LDLC SERPL CALC-MCNC: 53 MG/DL
LEUKOCYTE ESTERASE URINE: NEGATIVE
LYMPHOCYTES # BLD AUTO: 3.98 K/UL
LYMPHOCYTES NFR BLD AUTO: 30.7 %
MAN DIFF?: NORMAL
MCHC RBC-ENTMCNC: 31.4 PG
MCHC RBC-ENTMCNC: 31.8 GM/DL
MCV RBC AUTO: 98.7 FL
MICROSCOPIC-UA: NORMAL
MONOCYTES # BLD AUTO: 1.31 K/UL
MONOCYTES NFR BLD AUTO: 10.1 %
NEUTROPHILS # BLD AUTO: 7.39 K/UL
NEUTROPHILS NFR BLD AUTO: 56.8 %
NITRITE URINE: NEGATIVE
NONHDLC SERPL-MCNC: 72 MG/DL
PH URINE: 6
PLATELET # BLD AUTO: 251 K/UL
POTASSIUM SERPL-SCNC: 5.7 MMOL/L
PROT SERPL-MCNC: 7.1 G/DL
PROTEIN URINE: NEGATIVE
PSA SERPL-MCNC: 0.72 NG/ML
RBC # BLD: 4.46 M/UL
RBC # FLD: 15.2 %
RED BLOOD CELLS URINE: 0 /HPF
SODIUM SERPL-SCNC: 141 MMOL/L
SPECIFIC GRAVITY URINE: 1.01
SQUAMOUS EPITHELIAL CELLS: 0 /HPF
TRIGL SERPL-MCNC: 97 MG/DL
TSH SERPL-ACNC: 2.25 UIU/ML
UROBILINOGEN URINE: NORMAL
WBC # FLD AUTO: 12.98 K/UL
WHITE BLOOD CELLS URINE: 0 /HPF

## 2022-12-27 ENCOUNTER — APPOINTMENT (OUTPATIENT)
Dept: INTERNAL MEDICINE | Facility: CLINIC | Age: 82
End: 2022-12-27

## 2022-12-27 VITALS
BODY MASS INDEX: 27.11 KG/M2 | SYSTOLIC BLOOD PRESSURE: 160 MMHG | HEIGHT: 69 IN | WEIGHT: 183 LBS | DIASTOLIC BLOOD PRESSURE: 70 MMHG

## 2022-12-27 PROCEDURE — 99214 OFFICE O/P EST MOD 30 MIN: CPT | Mod: 25

## 2022-12-27 PROCEDURE — 36415 COLL VENOUS BLD VENIPUNCTURE: CPT

## 2022-12-27 RX ORDER — LOSARTAN POTASSIUM AND HYDROCHLOROTHIAZIDE 12.5; 5 MG/1; MG/1
50-12.5 TABLET ORAL
Qty: 90 | Refills: 3 | Status: COMPLETED | COMMUNITY
Start: 2022-12-27 | End: 2022-12-27

## 2022-12-27 RX ORDER — FUROSEMIDE 20 MG/1
20 TABLET ORAL
Qty: 90 | Refills: 0 | Status: COMPLETED | COMMUNITY
Start: 2022-11-17 | End: 2022-12-27

## 2022-12-27 NOTE — PHYSICAL EXAM
[No Acute Distress] : no acute distress [Well Nourished] : well nourished [Well Developed] : well developed [Well-Appearing] : well-appearing [Normal Sclera/Conjunctiva] : normal sclera/conjunctiva [PERRL] : pupils equal round and reactive to light [EOMI] : extraocular movements intact [Normal Outer Ear/Nose] : the outer ears and nose were normal in appearance [Normal Oropharynx] : the oropharynx was normal [No JVD] : no jugular venous distention [No Lymphadenopathy] : no lymphadenopathy [Supple] : supple [Thyroid Normal, No Nodules] : the thyroid was normal and there were no nodules present [No Respiratory Distress] : no respiratory distress  [No Accessory Muscle Use] : no accessory muscle use [Clear to Auscultation] : lungs were clear to auscultation bilaterally [Normal Rate] : normal rate  [Regular Rhythm] : with a regular rhythm [Normal S1, S2] : normal S1 and S2 [No Murmur] : no murmur heard [No Carotid Bruits] : no carotid bruits [No Abdominal Bruit] : a ~M bruit was not heard ~T in the abdomen [No Varicosities] : no varicosities [Pedal Pulses Present] : the pedal pulses are present [No Edema] : there was no peripheral edema Awake/Alert/Cooperative [No Palpable Aorta] : no palpable aorta [No Extremity Clubbing/Cyanosis] : no extremity clubbing/cyanosis [Soft] : abdomen soft [Non Tender] : non-tender [Non-distended] : non-distended [No Masses] : no abdominal mass palpated [No HSM] : no HSM [Normal Bowel Sounds] : normal bowel sounds [Normal Posterior Cervical Nodes] : no posterior cervical lymphadenopathy [Normal Anterior Cervical Nodes] : no anterior cervical lymphadenopathy [No CVA Tenderness] : no CVA  tenderness [No Spinal Tenderness] : no spinal tenderness [No Joint Swelling] : no joint swelling [Grossly Normal Strength/Tone] : grossly normal strength/tone [No Rash] : no rash [Coordination Grossly Intact] : coordination grossly intact [No Focal Deficits] : no focal deficits [Normal Gait] : normal gait [Deep Tendon Reflexes (DTR)] : deep tendon reflexes were 2+ and symmetric [Normal Affect] : the affect was normal [Normal Insight/Judgement] : insight and judgment were intact

## 2022-12-27 NOTE — HISTORY OF PRESENT ILLNESS
[FreeTextEntry1] : Patient here in follow up to last visit and prior issue\par  [de-identified] : Patient multiple medical problems under good control.  Hypertension cholesterol without difficulty.  He has paroxysmal atrial fibrillation with one recently that reverted spontaneously.  He is under the care of EPS physician.  Had an ablation done previously and is on chronic anticoagulation\par He is received COVID x3 only and is 2 years overdue for colonoscopy due to his underlying Crohn's.  He otherwise feels well with no chest pain shortness of breath he has no neurological complaints and feels overall well\par \par [id number] patient routine follow-up to medical problems.  Patient concerned that his potassium is slightly elevated.  Patient eats a very high potassium diet with blueberries almonds bananas all types of nuts all of them very high in potassium.  He also notes that his blood pressures been trending higher.  He self treat himself with diuretic for few days but not anymore

## 2022-12-27 NOTE — ASSESSMENT
[FreeTextEntry1] : Patient examined and adjustments to therapy for HBP needs to be made all labs reviewed with patient as well. Review of systems and physical exam discussed with patient. Care plan for future followups discussed with patient. All issues of health for the current year discussed in depth with patient who was conversant and all relevant issues addressed.\par Will avoid ACE or ARB due to significant potassium intake and also borderline elevated potassium with labs.  We will instead give amlodipine 5 mg daily and follow-up in 4 weeks.  Patient has been educated on a lower potassium diet and he will modify.  Repeat labs drawn today.  I all issues regarding patient's health and medical problems have been discussed. The patient understands and concurs with the treatment plan.\par \par Had long conversation with patient regarding potassium in diet and he will remove bananas and reduce nuts but will continue with his fresh fruit and potatoes.  We will repeat today and monitor potassium next visit

## 2022-12-28 ENCOUNTER — TRANSCRIPTION ENCOUNTER (OUTPATIENT)
Age: 82
End: 2022-12-28

## 2022-12-28 LAB
ANION GAP SERPL CALC-SCNC: 10 MMOL/L
BUN SERPL-MCNC: 21 MG/DL
CALCIUM SERPL-MCNC: 9.7 MG/DL
CHLORIDE SERPL-SCNC: 101 MMOL/L
CO2 SERPL-SCNC: 28 MMOL/L
CREAT SERPL-MCNC: 0.91 MG/DL
EGFR: 85 ML/MIN/1.73M2
GLUCOSE SERPL-MCNC: 104 MG/DL
POTASSIUM SERPL-SCNC: 5.3 MMOL/L
SODIUM SERPL-SCNC: 139 MMOL/L

## 2023-01-23 ENCOUNTER — APPOINTMENT (OUTPATIENT)
Dept: INTERNAL MEDICINE | Facility: CLINIC | Age: 83
End: 2023-01-23
Payer: MEDICARE

## 2023-01-23 VITALS
WEIGHT: 183 LBS | HEIGHT: 69 IN | BODY MASS INDEX: 27.11 KG/M2 | DIASTOLIC BLOOD PRESSURE: 65 MMHG | SYSTOLIC BLOOD PRESSURE: 130 MMHG

## 2023-01-23 DIAGNOSIS — E87.5 HYPERKALEMIA: ICD-10-CM

## 2023-01-23 PROCEDURE — 36415 COLL VENOUS BLD VENIPUNCTURE: CPT

## 2023-01-23 PROCEDURE — 99214 OFFICE O/P EST MOD 30 MIN: CPT | Mod: 25

## 2023-01-23 NOTE — ASSESSMENT
[FreeTextEntry1] : All issues regarding patient's health and medical problems have been discussed. The patient understands and concurs with the treatment plan.\par Bloods drawn discussed with patient regarding his use of diazepam and states he has no adverse reactions and helps him to sleep so will be completed\par Patient examined and adjustments to therapy for HBP not needed his blood pressure is perfect amlodipine all labs reviewed with patient as well. Review of systems and physical exam discussed with patient. Care plan for future followups discussed with patient. All issues of health for the current year discussed in depth with patient who was conversant and all relevant issues addressed.

## 2023-01-23 NOTE — HISTORY OF PRESENT ILLNESS
[FreeTextEntry1] : Patient here in follow up to last visit and prior issue\par  [de-identified] : Patient multiple medical problems under good control.  Hypertension cholesterol without difficulty.  He has paroxysmal atrial fibrillation with one recently that reverted spontaneously.  He is under the care of EPS physician.  Had an ablation done previously and is on chronic anticoagulation\par He is received COVID x3 only and is 2 years overdue for colonoscopy due to his underlying Crohn's.  He otherwise feels well with no chest pain shortness of breath he has no neurological complaints and feels overall well\par \par [id number] patient routine follow-up to medical problems.  Patient concerned that his potassium is slightly elevated.  Patient eats a very high potassium diet with blueberries almonds bananas all types of nuts all of them very high in potassium.  He also notes that his blood pressures been trending higher.  He self treat himself with diuretic for few days but not anymore\par \par [id number]\par Patient returns for follow-up regarding blood pressure and elevated potassium.  He has reduced his intake of tomatoes he otherwise feels well he offers no complaints from taking the new medicine amlodipine.

## 2023-01-24 ENCOUNTER — TRANSCRIPTION ENCOUNTER (OUTPATIENT)
Age: 83
End: 2023-01-24

## 2023-01-24 LAB
ANION GAP SERPL CALC-SCNC: 11 MMOL/L
BUN SERPL-MCNC: 22 MG/DL
CALCIUM SERPL-MCNC: 10.1 MG/DL
CHLORIDE SERPL-SCNC: 100 MMOL/L
CO2 SERPL-SCNC: 29 MMOL/L
CREAT SERPL-MCNC: 1.03 MG/DL
EGFR: 73 ML/MIN/1.73M2
GLUCOSE SERPL-MCNC: 94 MG/DL
POTASSIUM SERPL-SCNC: 5.3 MMOL/L
SODIUM SERPL-SCNC: 140 MMOL/L

## 2023-01-26 ENCOUNTER — APPOINTMENT (OUTPATIENT)
Dept: GASTROENTEROLOGY | Facility: CLINIC | Age: 83
End: 2023-01-26
Payer: MEDICARE

## 2023-01-26 VITALS
WEIGHT: 180 LBS | RESPIRATION RATE: 14 BRPM | OXYGEN SATURATION: 98 % | TEMPERATURE: 97.5 F | DIASTOLIC BLOOD PRESSURE: 70 MMHG | BODY MASS INDEX: 26.66 KG/M2 | HEART RATE: 63 BPM | HEIGHT: 69 IN | SYSTOLIC BLOOD PRESSURE: 126 MMHG

## 2023-01-26 PROCEDURE — 99203 OFFICE O/P NEW LOW 30 MIN: CPT

## 2023-01-26 NOTE — END OF VISIT
[FreeTextEntry3] : I was present for the evaluation and reviewed the findings and plan with both the patient and SUSAN Fried

## 2023-01-26 NOTE — ASSESSMENT
[FreeTextEntry1] : Patient is a 82 year old male, with PMH of afib with loop recorder on AC, Crohns colitis, HTN, HLD, hypothyroidism, who presents for follow up visit. Pt was last seen in 2018 for colonoscopy which showed normal colon and normal ileotransverse anastomosis s/p right  hemicolectomy. Pathology showed mild acute colitis with focal cryptitis in the descending colon and sigmoid. Normal pathology in rectum. \par \par Pt is feeling well and asymptomatic, taking Questran daily. BMs are normal, formed, without diarrhea or rectal bleeding. Most recent labs done in June 2022 showed a normal Hgb of 14.0. \par \par Patient is asymptomatic and his labs are stable, given his age and comorbidities, would not recommend continued surveillance, unless symptoms occur and diagnostic colonoscopy would then be recommended. Pt to continue on his current regiment and f/u prn. \par \par Pt was advised to f/u with Dr. Iglesias if needed, our IBD specialist.

## 2023-01-26 NOTE — PHYSICAL EXAM

## 2023-01-26 NOTE — ADDENDUM
[FreeTextEntry1] : I, Monica Pena PA-C, acted as a scribe for the services dictated to me by ANGELA Rodriguez in this document on Jan 26, 2023 for GALA CHAVEZ .\par

## 2023-01-26 NOTE — HISTORY OF PRESENT ILLNESS
[FreeTextEntry1] : 3/2018 for colonoscopy which showed normal colon and normal ileotransverse anastomosis. Pathology showed mild acute colitis with focal cryptitis in the descending colon and sigmoid. Normal pathology in rectum.

## 2023-05-22 ENCOUNTER — LABORATORY RESULT (OUTPATIENT)
Age: 83
End: 2023-05-22

## 2023-05-22 ENCOUNTER — APPOINTMENT (OUTPATIENT)
Dept: INTERNAL MEDICINE | Facility: CLINIC | Age: 83
End: 2023-05-22
Payer: MEDICARE

## 2023-05-22 VITALS
WEIGHT: 185 LBS | HEIGHT: 69 IN | DIASTOLIC BLOOD PRESSURE: 52 MMHG | BODY MASS INDEX: 27.4 KG/M2 | SYSTOLIC BLOOD PRESSURE: 120 MMHG

## 2023-05-22 PROCEDURE — 36415 COLL VENOUS BLD VENIPUNCTURE: CPT

## 2023-05-22 PROCEDURE — 99214 OFFICE O/P EST MOD 30 MIN: CPT | Mod: 25

## 2023-05-22 RX ORDER — METOPROLOL SUCCINATE 100 MG/1
100 TABLET, EXTENDED RELEASE ORAL DAILY
Refills: 0 | Status: DISCONTINUED | COMMUNITY
End: 2023-05-22

## 2023-05-22 RX ORDER — CEPHALEXIN 500 MG/1
500 CAPSULE ORAL
Qty: 6 | Refills: 0 | Status: COMPLETED | COMMUNITY
Start: 2023-01-10 | End: 2023-05-22

## 2023-05-22 RX ORDER — AMLODIPINE BESYLATE 5 MG/1
5 TABLET ORAL
Qty: 90 | Refills: 3 | Status: COMPLETED | COMMUNITY
Start: 2022-12-27 | End: 2023-05-22

## 2023-05-22 NOTE — ASSESSMENT
[FreeTextEntry1] : Patient examined and adjustments to therapy for HBP not needed all labs reviewed with patient as well. Review of systems and physical exam discussed with patient. Care plan for future followups discussed with patient. All issues of health for the current year discussed in depth with patient who was conversant and all relevant issues addressed.\par Cholesterol levels discussed with patient. Compliance with oral medication were also addressed . Ideal LDL levels were  discussed with the patient. All issues regarding the implications of high cholesterol necessity for treatment were discussed with the patient who is conversant and all relevant questions were asked and answered.\par Patient-thyroid and repeat labs\par Discussed with patient that he is overdue for COVID by Valent\par Labs drawn.  Insert care summary.  We will follow-up 6 months for wellness

## 2023-05-22 NOTE — HISTORY OF PRESENT ILLNESS
[FreeTextEntry1] : Patient here in follow up to last visit and prior issue\par  [de-identified] : Patient multiple medical problems under good control.  Hypertension cholesterol without difficulty.  He has paroxysmal atrial fibrillation with one recently that reverted spontaneously.  He is under the care of EPS physician.  Had an ablation done previously and is on chronic anticoagulation\par He is received COVID x3 only and is 2 years overdue for colonoscopy due to his underlying Crohn's.  He otherwise feels well with no chest pain shortness of breath he has no neurological complaints and feels overall well\par \par [id number]\par \par [id number] patient routine follow-up to medical problems.  Patient concerned that his potassium is slightly elevated.  Patient eats a very high potassium diet with blueberries almonds bananas all types of nuts all of them very high in potassium.  He also notes that his blood pressures been trending higher.  He self treat himself with diuretic for few days but not anymore\par \par [id number]\par Patient returns for follow-up regarding blood pressure and elevated potassium.  He has reduced his intake of tomatoes he otherwise feels well he offers no complaints from taking the new medicine amlodipine.\par \par [id number]\par Patient here for evaluation of multiple medical problems and new issues to be discussed\par Since last visit patient saw her GI and does not need a colonoscopy anymore.  In addition patient's cardiology and medications were adjusted due to fatigue Toprol and is on a new regimen with excellent blood pressure control.  He is compliant with other medications otherwise feels well

## 2023-05-23 ENCOUNTER — TRANSCRIPTION ENCOUNTER (OUTPATIENT)
Age: 83
End: 2023-05-23

## 2023-05-23 LAB
FERRITIN SERPL-MCNC: 367 NG/ML
FOLATE SERPL-MCNC: 12.6 NG/ML
TSH SERPL-ACNC: 8.85 UIU/ML
VIT B12 SERPL-MCNC: 1035 PG/ML

## 2023-05-23 RX ORDER — VALSARTAN AND HYDROCHLOROTHIAZIDE 80; 12.5 MG/1; MG/1
80-12.5 TABLET, FILM COATED ORAL DAILY
Qty: 90 | Refills: 3 | Status: ACTIVE | COMMUNITY
Start: 2023-03-08 | End: 1900-01-01

## 2023-05-23 RX ORDER — LEVOTHYROXINE SODIUM 0.14 MG/1
137 TABLET ORAL DAILY
Qty: 90 | Refills: 3 | Status: ACTIVE | COMMUNITY
Start: 1900-01-01 | End: 1900-01-01

## 2023-05-23 RX ORDER — APIXABAN 5 MG/1
5 TABLET, FILM COATED ORAL
Qty: 180 | Refills: 3 | Status: ACTIVE | COMMUNITY

## 2023-05-23 RX ORDER — CHOLESTYRAMINE 4 G/9G
4 POWDER, FOR SUSPENSION ORAL DAILY
Qty: 1 | Refills: 3 | Status: ACTIVE | COMMUNITY
Start: 1900-01-01 | End: 1900-01-01

## 2023-07-04 ENCOUNTER — TRANSCRIPTION ENCOUNTER (OUTPATIENT)
Age: 83
End: 2023-07-04

## 2023-07-31 PROBLEM — E03.9 HYPOTHYROID: Status: ACTIVE | Noted: 2018-07-19

## 2023-07-31 PROBLEM — I10 ESSENTIAL (PRIMARY) HYPERTENSION: Status: ACTIVE | Noted: 2018-07-19

## 2023-08-01 ENCOUNTER — LABORATORY RESULT (OUTPATIENT)
Age: 83
End: 2023-08-01

## 2023-08-01 ENCOUNTER — APPOINTMENT (OUTPATIENT)
Dept: INTERNAL MEDICINE | Facility: CLINIC | Age: 83
End: 2023-08-01
Payer: MEDICARE

## 2023-08-01 VITALS
HEIGHT: 69 IN | SYSTOLIC BLOOD PRESSURE: 138 MMHG | WEIGHT: 185 LBS | BODY MASS INDEX: 27.4 KG/M2 | DIASTOLIC BLOOD PRESSURE: 58 MMHG

## 2023-08-01 DIAGNOSIS — I48.0 PAROXYSMAL ATRIAL FIBRILLATION: ICD-10-CM

## 2023-08-01 DIAGNOSIS — Z13.31 ENCOUNTER FOR SCREENING FOR DEPRESSION: ICD-10-CM

## 2023-08-01 DIAGNOSIS — Z00.01 ENCOUNTER FOR GENERAL ADULT MEDICAL EXAMINATION WITH ABNORMAL FINDINGS: ICD-10-CM

## 2023-08-01 DIAGNOSIS — E03.9 HYPOTHYROIDISM, UNSPECIFIED: ICD-10-CM

## 2023-08-01 DIAGNOSIS — K50.10 CROHN'S DISEASE OF LARGE INTESTINE W/OUT COMPLICATIONS: ICD-10-CM

## 2023-08-01 DIAGNOSIS — I10 ESSENTIAL (PRIMARY) HYPERTENSION: ICD-10-CM

## 2023-08-01 PROCEDURE — G0439: CPT

## 2023-08-01 PROCEDURE — 99214 OFFICE O/P EST MOD 30 MIN: CPT | Mod: 25

## 2023-08-01 RX ORDER — NEBIVOLOL HYDROCHLORIDE 10 MG/1
10 TABLET ORAL
Qty: 180 | Refills: 0 | Status: ACTIVE | COMMUNITY

## 2023-08-01 RX ORDER — NEBIVOLOL 10 MG/1
10 TABLET ORAL DAILY
Qty: 90 | Refills: 3 | Status: DISCONTINUED | COMMUNITY
End: 2023-08-01

## 2023-08-01 NOTE — HISTORY OF PRESENT ILLNESS
[FreeTextEntry1] : Patient here for evaluation of multiple medical problems and new issues to be discussed wellness and Patient here for evaluation of multiple medical problems and new issues to be discussed [de-identified] : Patient multiple medical problems under good control.  Hypertension cholesterol without difficulty.  He has paroxysmal atrial fibrillation with one recently that reverted spontaneously.  He is under the care of EPS physician.  Had an ablation done previously and is on chronic anticoagulation He is received COVID x3 only and is 2 years overdue for colonoscopy due to his underlying Crohn's.  He otherwise feels well with no chest pain shortness of breath he has no neurological complaints and feels overall well  [id number]  [id number] patient routine follow-up to medical problems.  Patient concerned that his potassium is slightly elevated.  Patient eats a very high potassium diet with blueberries almonds bananas all types of nuts all of them very high in potassium.  He also notes that his blood pressures been trending higher.  He self treat himself with diuretic for few days but not anymore  [id number] Patient returns for follow-up regarding blood pressure and elevated potassium.  He has reduced his intake of tomatoes he otherwise feels well he offers no complaints from taking the new medicine amlodipine.  [id number] Patient here for evaluation of multiple medical problems and new issues to be discussed Since last visit patient saw her GI and does not need a colonoscopy anymore.  In addition patient's cardiology and medications were adjusted due to fatigue Toprol and is on a new regimen with excellent blood pressure control.  He is compliant with other medications otherwise feels well  [id number] wellness doing well no new sxs Patient compliant with all medications. yet  Occasional flare of Crohn's self treats with prednisone no bouts of A-fib that he is aware of

## 2023-08-01 NOTE — HEALTH RISK ASSESSMENT
[No] : No [No falls in past year] : Patient reported no falls in the past year [0] : 2) Feeling down, depressed, or hopeless: Not at all (0) [PHQ-2 Negative - No further assessment needed] : PHQ-2 Negative - No further assessment needed [Former] : Former [> 15 Years] : > 15 Years [[id number]] : 0

## 2023-08-01 NOTE — ASSESSMENT
[FreeTextEntry1] : Insert wellness Qi wellness exam completed. All issues of health and screening up to date. Immunizations and screening reviewed with patient. All issues of health for the current year discussed in depth with patient. Patient was conversant during the exam and all pertinent issues addressed. Depression screen [[id number] value] in chart. Fall prevention was addressed. Patient examined and adjustments to therapy for HBP not needed all labs reviewed with patient as well. Review of systems and physical exam discussed with patient. Care plan for future followups discussed with patient. All issues of health for the current year discussed in depth with patient who was conversant and all relevant issues addressed. Cholesterol levels discussed with patient. Compliance with oral medication were also addressed . Ideal LDL levels were  discussed with the patient. All issues regarding the implications of high cholesterol necessity for treatment were discussed with the patient who is conversant and all relevant questions were asked and answered. Crohn stable COVID monovalent and RSV vaccine recommended routine labs drawn follow-up 6 months

## 2023-08-02 LAB
ALBUMIN SERPL ELPH-MCNC: 4.2 G/DL
ALP BLD-CCNC: 62 U/L
ALT SERPL-CCNC: 66 U/L
ANION GAP SERPL CALC-SCNC: 12 MMOL/L
APPEARANCE: CLEAR
AST SERPL-CCNC: 48 U/L
BILIRUB SERPL-MCNC: 0.5 MG/DL
BILIRUBIN URINE: NEGATIVE
BLOOD URINE: NEGATIVE
BUN SERPL-MCNC: 29 MG/DL
CALCIUM SERPL-MCNC: 9.8 MG/DL
CHLORIDE SERPL-SCNC: 102 MMOL/L
CHOLEST SERPL-MCNC: 117 MG/DL
CO2 SERPL-SCNC: 27 MMOL/L
COLOR: YELLOW
CREAT SERPL-MCNC: 1.22 MG/DL
EGFR: 60 ML/MIN/1.73M2
ESTIMATED AVERAGE GLUCOSE: 120 MG/DL
GLUCOSE QUALITATIVE U: NEGATIVE MG/DL
GLUCOSE SERPL-MCNC: 97 MG/DL
HBA1C MFR BLD HPLC: 5.8 %
HDLC SERPL-MCNC: 41 MG/DL
KETONES URINE: NEGATIVE MG/DL
LDLC SERPL CALC-MCNC: 58 MG/DL
LEUKOCYTE ESTERASE URINE: NEGATIVE
NITRITE URINE: NEGATIVE
NONHDLC SERPL-MCNC: 77 MG/DL
PH URINE: 5.5
POTASSIUM SERPL-SCNC: 5.2 MMOL/L
PROT SERPL-MCNC: 7.1 G/DL
PROTEIN URINE: NEGATIVE MG/DL
SODIUM SERPL-SCNC: 141 MMOL/L
SPECIFIC GRAVITY URINE: 1.02
TRIGL SERPL-MCNC: 95 MG/DL
TSH SERPL-ACNC: 8 UIU/ML
UROBILINOGEN URINE: 0.2 MG/DL

## 2023-08-25 NOTE — PHYSICAL EXAM
Occupational Therapy      Patient Name:  Danish Valladares   MRN:  [id number]    Met with pt who stated that he is walking independently to the bathroom and in the halls multiple times daily with his wife. He also reports that he is (I) with all ADLs. Agreeable to have OT services D/Cd.     MATT Aguila    8/25/2023   [No Acute Distress] : no acute distress [Well Nourished] : well nourished [Well Developed] : well developed [Well-Appearing] : well-appearing [Normal Sclera/Conjunctiva] : normal sclera/conjunctiva [PERRL] : pupils equal round and reactive to light [EOMI] : extraocular movements intact [Normal Outer Ear/Nose] : the outer ears and nose were normal in appearance [Normal Oropharynx] : the oropharynx was normal [No JVD] : no jugular venous distention [No Lymphadenopathy] : no lymphadenopathy [Supple] : supple [Thyroid Normal, No Nodules] : the thyroid was normal and there were no nodules present [No Respiratory Distress] : no respiratory distress  [No Accessory Muscle Use] : no accessory muscle use [Clear to Auscultation] : lungs were clear to auscultation bilaterally [Normal Rate] : normal rate  [Regular Rhythm] : with a regular rhythm [Normal S1, S2] : normal S1 and S2 [No Murmur] : no murmur heard [No Carotid Bruits] : no carotid bruits [No Abdominal Bruit] : a ~M bruit was not heard ~T in the abdomen [No Varicosities] : no varicosities [Pedal Pulses Present] : the pedal pulses are present [No Edema] : there was no peripheral edema [No Palpable Aorta] : no palpable aorta [No Extremity Clubbing/Cyanosis] : no extremity clubbing/cyanosis [Soft] : abdomen soft [Non Tender] : non-tender [Non-distended] : non-distended [No Masses] : no abdominal mass palpated [No HSM] : no HSM [Normal Bowel Sounds] : normal bowel sounds [Normal Posterior Cervical Nodes] : no posterior cervical lymphadenopathy [Normal Anterior Cervical Nodes] : no anterior cervical lymphadenopathy [No CVA Tenderness] : no CVA  tenderness [No Spinal Tenderness] : no spinal tenderness [No Joint Swelling] : no joint swelling [Grossly Normal Strength/Tone] : grossly normal strength/tone [No Rash] : no rash [Coordination Grossly Intact] : coordination grossly intact [No Focal Deficits] : no focal deficits [Normal Gait] : normal gait [Deep Tendon Reflexes (DTR)] : deep tendon reflexes were 2+ and symmetric [Normal Affect] : the affect was normal [Normal Insight/Judgement] : insight and judgment were intact

## 2023-10-26 ENCOUNTER — OFFICE (OUTPATIENT)
Dept: URBAN - METROPOLITAN AREA CLINIC 104 | Facility: CLINIC | Age: 83
Setting detail: OPHTHALMOLOGY
End: 2023-10-26
Payer: MEDICARE

## 2023-10-26 DIAGNOSIS — H17.823: ICD-10-CM

## 2023-10-26 DIAGNOSIS — H25.13: ICD-10-CM

## 2023-10-26 PROCEDURE — 92014 COMPRE OPH EXAM EST PT 1/>: CPT | Performed by: SPECIALIST

## 2023-10-26 ASSESSMENT — KERATOMETRY
OD_K1POWER_DIOPTERS: 41.72
OS_K2POWER_DIOPTERS: 44.00
OD_AXISANGLE_DEGREES: 004
OD_K2POWER_DIOPTERS: 44.18
OS_K1POWER_DIOPTERS: 42.94
OS_AXISANGLE_DEGREES: 052

## 2023-10-26 ASSESSMENT — REFRACTION_AUTOREFRACTION
OD_CYLINDER: -2.25
OS_SPHERE: +1.00
OD_AXIS: 102
OS_CYLINDER: -2.00
OD_SPHERE: +1.50
OS_AXIS: 127

## 2023-10-26 ASSESSMENT — REFRACTION_CURRENTRX
OD_CYLINDER: -2.00
OS_OVR_VA: 20/
OS_SPHERE: +0.75
OD_OVR_VA: 20/
OS_AXIS: 140
OS_OVR_VA: 20/
OS_CYLINDER: -1.00
OD_AXIS: 88
OD_OVR_VA: 20/
OD_SPHERE: +1.75

## 2023-10-26 ASSESSMENT — AXIALLENGTH_DERIVED
OD_AL: 23.648
OS_AL: 23.6029

## 2023-10-26 ASSESSMENT — SPHEQUIV_DERIVED
OS_SPHEQUIV: 0
OD_SPHEQUIV: 0.375

## 2023-10-26 ASSESSMENT — DRY EYES - PHYSICIAN NOTES: OD_GENERALCOMMENTS: S NODULES

## 2023-10-26 ASSESSMENT — CORNEAL SURGICAL SCARRING
OD_SCARRING: STROMAL
OS_SCARRING: STROMAL

## 2023-10-26 ASSESSMENT — CONFRONTATIONAL VISUAL FIELD TEST (CVF)
OD_FINDINGS: FULL
OS_FINDINGS: FULL

## 2023-10-26 ASSESSMENT — VISUAL ACUITY
OS_BCVA: 20/30
OD_BCVA: 20/30

## 2023-10-26 ASSESSMENT — TONOMETRY
OD_IOP_MMHG: 17
OS_IOP_MMHG: 17

## 2023-12-12 ENCOUNTER — TRANSCRIPTION ENCOUNTER (OUTPATIENT)
Age: 83
End: 2023-12-12

## 2024-02-13 ENCOUNTER — TRANSCRIPTION ENCOUNTER (OUTPATIENT)
Age: 84
End: 2024-02-13

## 2024-04-17 ENCOUNTER — TRANSCRIPTION ENCOUNTER (OUTPATIENT)
Age: 84
End: 2024-04-17

## 2024-04-17 RX ORDER — DIAZEPAM 5 MG/1
5 TABLET ORAL
Qty: 30 | Refills: 0 | Status: ACTIVE | COMMUNITY
Start: 1900-01-01 | End: 1900-01-01

## 2024-04-25 ENCOUNTER — OFFICE (OUTPATIENT)
Dept: URBAN - METROPOLITAN AREA CLINIC 104 | Facility: CLINIC | Age: 84
Setting detail: OPHTHALMOLOGY
End: 2024-04-25
Payer: MEDICARE

## 2024-04-25 DIAGNOSIS — H25.13: ICD-10-CM

## 2024-04-25 DIAGNOSIS — H25.89: ICD-10-CM

## 2024-04-25 DIAGNOSIS — H17.823: ICD-10-CM

## 2024-04-25 PROCEDURE — 99213 OFFICE O/P EST LOW 20 MIN: CPT | Performed by: SPECIALIST

## 2024-07-08 ENCOUNTER — APPOINTMENT (OUTPATIENT)
Dept: RHEUMATOLOGY | Facility: CLINIC | Age: 84
End: 2024-07-08

## 2024-07-22 ENCOUNTER — APPOINTMENT (OUTPATIENT)
Dept: RHEUMATOLOGY | Facility: CLINIC | Age: 84
End: 2024-07-22

## 2024-08-06 ENCOUNTER — APPOINTMENT (OUTPATIENT)
Dept: INTERNAL MEDICINE | Facility: CLINIC | Age: 84
End: 2024-08-06

## 2024-08-06 PROCEDURE — G0444 DEPRESSION SCREEN ANNUAL: CPT | Mod: 59

## 2024-08-06 PROCEDURE — 99214 OFFICE O/P EST MOD 30 MIN: CPT | Mod: 25

## 2024-08-06 PROCEDURE — 36415 COLL VENOUS BLD VENIPUNCTURE: CPT

## 2024-08-06 PROCEDURE — G0439: CPT

## 2024-08-06 NOTE — HEALTH RISK ASSESSMENT
[Good] : ~his/her~  mood as  good [No falls in past year] : Patient reported no falls in the past year [0] : 2) Feeling down, depressed, or hopeless: Not at all (0) [PHQ-2 Negative - No further assessment needed] : PHQ-2 Negative - No further assessment needed [Time Spent: ___ Minutes] : I spent [unfilled] minutes performing a depression screening for this patient. [[id number]] : 0

## 2024-08-06 NOTE — PHYSICAL EXAM
[No Acute Distress] : no acute distress [Well Nourished] : well nourished [Well Developed] : well developed [Well-Appearing] : well-appearing [Normal Sclera/Conjunctiva] : normal sclera/conjunctiva [PERRL] : pupils equal round and reactive to light [EOMI] : extraocular movements intact [Normal Outer Ear/Nose] : the outer ears and nose were normal in appearance [Normal Oropharynx] : the oropharynx was normal [No JVD] : no jugular venous distention [No Lymphadenopathy] : no lymphadenopathy [Supple] : supple [Thyroid Normal, No Nodules] : the thyroid was normal and there were no nodules present [No Respiratory Distress] : no respiratory distress  [No Accessory Muscle Use] : no accessory muscle use [Clear to Auscultation] : lungs were clear to auscultation bilaterally [Normal Rate] : normal rate  [Regular Rhythm] : with a regular rhythm [Normal S1, S2] : normal S1 and S2 [No Murmur] : no murmur heard [No Carotid Bruits] : no carotid bruits [No Abdominal Bruit] : a ~M bruit was not heard ~T in the abdomen [No Varicosities] : no varicosities [Pedal Pulses Present] : the pedal pulses are present [No Edema] : there was no peripheral edema [No Palpable Aorta] : no palpable aorta [No Extremity Clubbing/Cyanosis] : no extremity clubbing/cyanosis [Soft] : abdomen soft [Non Tender] : non-tender [Non-distended] : non-distended [No Masses] : no abdominal mass palpated [No HSM] : no HSM [Normal Bowel Sounds] : normal bowel sounds [Normal Posterior Cervical Nodes] : no posterior cervical lymphadenopathy [Normal Anterior Cervical Nodes] : no anterior cervical lymphadenopathy [No CVA Tenderness] : no CVA  tenderness [No Spinal Tenderness] : no spinal tenderness [No Joint Swelling] : no joint swelling [Grossly Normal Strength/Tone] : grossly normal strength/tone [No Rash] : no rash [Coordination Grossly Intact] : coordination grossly intact [No Focal Deficits] : no focal deficits [Normal Gait] : normal gait [Deep Tendon Reflexes (DTR)] : deep tendon reflexes were 2+ and symmetric [Normal Affect] : the affect was normal [Normal Insight/Judgement] : insight and judgment were intact 0

## 2024-08-06 NOTE — HISTORY OF PRESENT ILLNESS
[FreeTextEntry1] : Patient here for evaluation of their medical problems and new issues to be discussed. wellness [de-identified] : Patient multiple medical problems under good control.  Hypertension cholesterol without difficulty.  He has paroxysmal atrial fibrillation with one recently that reverted spontaneously.  He is under the care of EPS physician.  Had an ablation done previously and is on chronic anticoagulation He is received COVID x3 only and is 2 years overdue for colonoscopy due to his underlying Crohn's.  He otherwise feels well with no chest pain shortness of breath he has no neurological complaints and feels overall well  [id number]  [id number] patient routine follow-up to medical problems.  Patient concerned that his potassium is slightly elevated.  Patient eats a very high potassium diet with blueberries almonds bananas all types of nuts all of them very high in potassium.  He also notes that his blood pressures been trending higher.  He self treat himself with diuretic for few days but not anymore  [id number] Patient returns for follow-up regarding blood pressure and elevated potassium.  He has reduced his intake of tomatoes he otherwise feels well he offers no complaints from taking the new medicine amlodipine.  [id number] Patient here for evaluation of multiple medical problems and new issues to be discussed Since last visit patient saw her GI and does not need a colonoscopy anymore.  In addition patient's cardiology and medications were adjusted due to fatigue Toprol and is on a new regimen with excellent blood pressure control.  He is compliant with other medications otherwise feels well  [id number] wellness doing well no new sxs Patient compliant with all medications. yet  Occasional flare of Crohn's self treats with prednisone no bouts of A-fib that he is aware of  [id number] wellness first visit in 1 year 7 mos ago - polyarticlar swelling ankle - knees - neck had MRI done that was negative saw rheumatology after he placed himself on steroids and the labs were normal.  At same time he had a Crohn's flare which most likely was the cause.  Labs not available.  Since then he has been asymptomatic Patient otherwise healthy.  Refusing all vaccines.  Has not seen GI for follow-up colonoscopy although recommended.  He has no new problems.  No further polyarthritis He did have a bout of atrial fibrillation in the past year that required cardioversion remains in normal sinus rhythm on Eliquis

## 2024-08-06 NOTE — HEALTH RISK ASSESSMENT
[Good] : ~his/her~  mood as  good [No falls in past year] : Patient reported no falls in the past year [0] : 2) Feeling down, depressed, or hopeless: Not at all (0) [PHQ-2 Negative - No further assessment needed] : PHQ-2 Negative - No further assessment needed [Time Spent: ___ Minutes] : I spent [unfilled] minutes performing a depression screening for this patient. [[id number]] : 0

## 2024-08-06 NOTE — ASSESSMENT
[FreeTextEntry1] : Wellness exam completed. All issues of health and screening up to date. Immunizations and screening reviewed with patient. All issues of health for the current year discussed in depth with patient. Patient was conversant during the exam and all pertinent issues addressed.  Fall prevention was addressed.Depression screen 0 in chart.  All labs reviewed with patient as well. Review of systems and physical exam discussed with patient. Care plan for future followups discussed with patient. All issues of health for the current year discussed in depth with patient who was conversant and all relevant issues addressed.Patient examined and adjustments to therapy for HBP not needed  Patient with polyarthritis presumably due to Crohn's flare.  I did not see an HLA-B27 ordered and will do.  He will follow-up with rheumatology if it recurs.  Recommend that he see GI for consideration for repeat colonoscopy  Patient remains in normal sinus rhythm on Eliquis. All issues regarding patient's health and medical problems have been discussed. The patient understands and concurs with the treatment plan.

## 2024-08-06 NOTE — HISTORY OF PRESENT ILLNESS
[FreeTextEntry1] : Patient here for evaluation of their medical problems and new issues to be discussed. wellness [de-identified] : Patient multiple medical problems under good control.  Hypertension cholesterol without difficulty.  He has paroxysmal atrial fibrillation with one recently that reverted spontaneously.  He is under the care of EPS physician.  Had an ablation done previously and is on chronic anticoagulation He is received COVID x3 only and is 2 years overdue for colonoscopy due to his underlying Crohn's.  He otherwise feels well with no chest pain shortness of breath he has no neurological complaints and feels overall well  [id number]  [id number] patient routine follow-up to medical problems.  Patient concerned that his potassium is slightly elevated.  Patient eats a very high potassium diet with blueberries almonds bananas all types of nuts all of them very high in potassium.  He also notes that his blood pressures been trending higher.  He self treat himself with diuretic for few days but not anymore  852233 Patient returns for follow-up regarding blood pressure and elevated potassium.  He has reduced his intake of tomatoes he otherwise feels well he offers no complaints from taking the new medicine amlodipine.  815417 Patient here for evaluation of multiple medical problems and new issues to be discussed Since last visit patient saw her GI and does not need a colonoscopy anymore.  In addition patient's cardiology and medications were adjusted due to fatigue Toprol and is on a new regimen with excellent blood pressure control.  He is compliant with other medications otherwise feels well  [id number] wellness doing well no new sxs Patient compliant with all medications. yet  Occasional flare of Crohn's self treats with prednisone no bouts of A-fib that he is aware of  [id number] wellness first visit in 1 year 7 mos ago - polyarticlar swelling ankle - knees - neck had MRI done that was negative saw rheumatology after he placed himself on steroids and the labs were normal.  At same time he had a Crohn's flare which most likely was the cause.  Labs not available.  Since then he has been asymptomatic Patient otherwise healthy.  Refusing all vaccines.  Has not seen GI for follow-up colonoscopy although recommended.  He has no new problems.  No further polyarthritis He did have a bout of atrial fibrillation in the past year that required cardioversion remains in normal sinus rhythm on Eliquis

## 2024-08-06 NOTE — REVIEW OF SYSTEMS
[Negative] : Heme/Lymph [FreeTextEntry5] : parox afib - cardioverted x 1  [FreeTextEntry7] : had flare x 1  [FreeTextEntry9] : had polyarthralgia - resolved

## 2024-08-07 ENCOUNTER — TRANSCRIPTION ENCOUNTER (OUTPATIENT)
Age: 84
End: 2024-08-07

## 2024-08-12 ENCOUNTER — TRANSCRIPTION ENCOUNTER (OUTPATIENT)
Age: 84
End: 2024-08-12

## 2024-11-21 ENCOUNTER — OFFICE (OUTPATIENT)
Dept: URBAN - METROPOLITAN AREA CLINIC 104 | Facility: CLINIC | Age: 84
Setting detail: OPHTHALMOLOGY
End: 2024-11-21
Payer: MEDICARE

## 2024-11-21 ENCOUNTER — NON-APPOINTMENT (OUTPATIENT)
Age: 84
End: 2024-11-21

## 2024-11-21 DIAGNOSIS — H17.823: ICD-10-CM

## 2024-11-21 DIAGNOSIS — H25.89: ICD-10-CM

## 2024-11-21 DIAGNOSIS — H25.13: ICD-10-CM

## 2024-11-21 PROCEDURE — 92014 COMPRE OPH EXAM EST PT 1/>: CPT | Performed by: SPECIALIST

## 2024-11-21 ASSESSMENT — REFRACTION_AUTOREFRACTION
OD_AXIS: 94
OS_SPHERE: +1.00
OD_SPHERE: +1.50
OD_CYLINDER: -2.00
OS_CYLINDER: -2.25
OS_AXIS: 132

## 2024-11-21 ASSESSMENT — REFRACTION_CURRENTRX
OD_OVR_VA: 20/
OS_OVR_VA: 20/

## 2024-11-21 ASSESSMENT — DRY EYES - PHYSICIAN NOTES: OD_GENERALCOMMENTS: S NODULES

## 2024-11-21 ASSESSMENT — PACHYMETRY
OS_CT_UM: 594
OD_CT_UM: 617
OS_CT_CORRECTION: -4
OD_CT_CORRECTION: -5

## 2024-11-21 ASSESSMENT — TONOMETRY
OD_IOP_MMHG: 16
OS_IOP_MMHG: 16

## 2024-11-21 ASSESSMENT — CONFRONTATIONAL VISUAL FIELD TEST (CVF)
OD_FINDINGS: FULL
OS_FINDINGS: FULL

## 2024-11-21 ASSESSMENT — VISUAL ACUITY
OD_BCVA: 20/30
OS_BCVA: 20/25+

## 2024-11-21 ASSESSMENT — CORNEAL SURGICAL SCARRING: OD_SCARRING: STROMAL

## 2025-01-29 ENCOUNTER — APPOINTMENT (OUTPATIENT)
Dept: INTERNAL MEDICINE | Facility: CLINIC | Age: 85
End: 2025-01-29
Payer: MEDICARE

## 2025-01-29 VITALS
DIASTOLIC BLOOD PRESSURE: 79 MMHG | BODY MASS INDEX: 25.77 KG/M2 | SYSTOLIC BLOOD PRESSURE: 124 MMHG | HEIGHT: 69 IN | WEIGHT: 174 LBS

## 2025-01-29 DIAGNOSIS — J40 BRONCHITIS, NOT SPECIFIED AS ACUTE OR CHRONIC: ICD-10-CM

## 2025-01-29 DIAGNOSIS — I48.91 UNSPECIFIED ATRIAL FIBRILLATION: ICD-10-CM

## 2025-01-29 PROCEDURE — 99214 OFFICE O/P EST MOD 30 MIN: CPT

## 2025-01-29 RX ORDER — CEFPODOXIME PROXETIL 200 MG/1
200 TABLET, FILM COATED ORAL
Qty: 14 | Refills: 0 | Status: ACTIVE | COMMUNITY
Start: 2025-01-29 | End: 1900-01-01

## 2025-01-29 RX ORDER — METOPROLOL SUCCINATE 100 MG/1
100 TABLET, EXTENDED RELEASE ORAL
Qty: 180 | Refills: 3 | Status: ACTIVE | COMMUNITY
Start: 2025-01-29

## 2025-01-29 RX ORDER — FUROSEMIDE 20 MG/1
20 TABLET ORAL
Qty: 30 | Refills: 1 | Status: ACTIVE | COMMUNITY
Start: 2025-01-29

## 2025-04-02 ENCOUNTER — TRANSCRIPTION ENCOUNTER (OUTPATIENT)
Age: 85
End: 2025-04-02

## 2025-05-22 ENCOUNTER — OFFICE (OUTPATIENT)
Dept: URBAN - METROPOLITAN AREA CLINIC 104 | Facility: CLINIC | Age: 85
Setting detail: OPHTHALMOLOGY
End: 2025-05-22
Payer: MEDICARE

## 2025-05-22 DIAGNOSIS — H25.89: ICD-10-CM

## 2025-05-22 DIAGNOSIS — H17.823: ICD-10-CM

## 2025-05-22 DIAGNOSIS — H25.13: ICD-10-CM

## 2025-05-22 DIAGNOSIS — H25.11: ICD-10-CM

## 2025-05-22 PROBLEM — H25.12: Status: ACTIVE | Noted: 2025-05-22

## 2025-05-22 PROCEDURE — 92014 COMPRE OPH EXAM EST PT 1/>: CPT | Performed by: SPECIALIST

## 2025-05-22 PROCEDURE — 92136 OPHTHALMIC BIOMETRY: CPT | Mod: 26,RT | Performed by: SPECIALIST

## 2025-05-22 PROCEDURE — 92136 OPHTHALMIC BIOMETRY: CPT | Mod: TC | Performed by: SPECIALIST

## 2025-05-22 ASSESSMENT — PACHYMETRY
OD_CT_CORRECTION: -5
OD_CT_UM: 617
OS_CT_CORRECTION: -4
OS_CT_UM: 594

## 2025-05-22 ASSESSMENT — KERATOMETRY
OS_AXISANGLE2_DEGREES: 040
OS_K2POWER_DIOPTERS: 43.66
OD_AXISANGLE_DEGREES: 017
OD_K1POWER_DIOPTERS: 41.31
OD_AXISANGLE_DEGREES: 017
OD_CYLPOWER_DEGREES: 2.75
OS_CYLPOWER_DEGREES: 0.66999999
OS_K1POWER_DIOPTERS: 42.99
OS_AXISANGLE_DEGREES: 040
OD_K1POWER_DIOPTERS: 41.31
OS_AXISANGLE_DEGREES: 040
OD_CYLAXISANGLE_DEGREES: 17
OD_K2POWER_DIOPTERS: 44.06
OS_CYLAXISANGLE_DEGREES: 40
OS_K2POWER_DIOPTERS: 43.66
OS_K1POWER_DIOPTERS: 42.99
OD_K2POWER_DIOPTERS: 44.06
OD_K1K2_AVERAGE: 42.685
OS_K1K2_AVERAGE: 43.325
OD_AXISANGLE2_DEGREES: 017

## 2025-05-22 ASSESSMENT — TONOMETRY
OD_IOP_MMHG: 16
OS_IOP_MMHG: 16

## 2025-05-22 ASSESSMENT — REFRACTION_AUTOREFRACTION
OD_CYLINDER: -2.25
OS_AXIS: 119
OD_SPHERE: +1.75
OD_AXIS: 098
OS_SPHERE: +1.25
OS_CYLINDER: -1.50

## 2025-05-22 ASSESSMENT — REFRACTION_MANIFEST
OD_VA1: 20/40
OS_AXIS: 120
OD_CYLINDER: -2.25
OS_CYLINDER: -1.50
OS_VA1: 20/50
OD_SPHERE: +1.75
OD_AXIS: 100
OS_SPHERE: +1.25

## 2025-05-22 ASSESSMENT — DRY EYES - PHYSICIAN NOTES: OD_GENERALCOMMENTS: S NODULES

## 2025-05-22 ASSESSMENT — CONFRONTATIONAL VISUAL FIELD TEST (CVF)
OD_FINDINGS: FULL
OS_FINDINGS: FULL

## 2025-05-22 ASSESSMENT — VISUAL ACUITY
OD_BCVA: 20/50
OS_BCVA: 20/50

## 2025-05-22 ASSESSMENT — CORNEAL SURGICAL SCARRING: OD_SCARRING: STROMAL

## 2025-07-10 ENCOUNTER — RX ONLY (RX ONLY)
Age: 85
End: 2025-07-10

## 2025-07-11 ENCOUNTER — ASC (OUTPATIENT)
Dept: URBAN - METROPOLITAN AREA SURGERY 8 | Facility: SURGERY | Age: 85
Setting detail: OPHTHALMOLOGY
End: 2025-07-11
Payer: MEDICARE

## 2025-07-11 DIAGNOSIS — H52.221: ICD-10-CM

## 2025-07-11 DIAGNOSIS — H25.11: ICD-10-CM

## 2025-07-11 PROCEDURE — FEMTO PRECISION LASER CATARACT SURGERY: Mod: GY | Performed by: SPECIALIST

## 2025-07-11 PROCEDURE — S9986 NOT MEDICALLY NECESSARY SVC: HCPCS | Mod: GX,GY | Performed by: SPECIALIST

## 2025-07-11 PROCEDURE — V2787 ASTIGMATISM-CORRECT FUNCTION: HCPCS | Performed by: SPECIALIST

## 2025-07-11 PROCEDURE — 68841 INSJ RX ELUT IMPLT LAC CANAL: CPT | Mod: RT | Performed by: SPECIALIST

## 2025-07-11 PROCEDURE — 66984 XCAPSL CTRC RMVL W/O ECP: CPT | Mod: RT | Performed by: SPECIALIST

## 2025-07-12 ENCOUNTER — OFFICE (OUTPATIENT)
Dept: URBAN - METROPOLITAN AREA CLINIC 104 | Facility: CLINIC | Age: 85
Setting detail: OPHTHALMOLOGY
End: 2025-07-12
Payer: MEDICARE

## 2025-07-12 DIAGNOSIS — Z96.1: ICD-10-CM

## 2025-07-12 PROCEDURE — 99024 POSTOP FOLLOW-UP VISIT: CPT | Performed by: OPTOMETRIST

## 2025-07-12 ASSESSMENT — REFRACTION_AUTOREFRACTION
OD_SPHERE: +1.75
OD_CYLINDER: -2.25
OS_CYLINDER: -1.50
OS_SPHERE: +1.25
OS_AXIS: 119
OD_AXIS: 098

## 2025-07-12 ASSESSMENT — CORNEAL EDEMA - FOLDS/STRIAE: OD_FOLDSSTRIAE: 1+

## 2025-07-12 ASSESSMENT — KERATOMETRY
OS_AXISANGLE_DEGREES: 040
OD_K2POWER_DIOPTERS: 44.06
OD_K1POWER_DIOPTERS: 41.31
OD_AXISANGLE_DEGREES: 017
OS_K1POWER_DIOPTERS: 42.99
OS_K2POWER_DIOPTERS: 43.66

## 2025-07-12 ASSESSMENT — REFRACTION_MANIFEST
OS_CYLINDER: -1.50
OS_AXIS: 120
OS_VA1: 20/50
OD_VA1: 20/40
OD_AXIS: 100
OS_SPHERE: +1.25
OD_CYLINDER: -2.25
OD_SPHERE: +1.75

## 2025-07-12 ASSESSMENT — VISUAL ACUITY
OD_BCVA: 20/50
OS_BCVA: 20/40

## 2025-07-12 ASSESSMENT — DRY EYES - PHYSICIAN NOTES: OD_GENERALCOMMENTS: S NODULES

## 2025-07-12 ASSESSMENT — CONFRONTATIONAL VISUAL FIELD TEST (CVF)
OD_FINDINGS: FULL
OS_FINDINGS: FULL

## 2025-07-12 ASSESSMENT — CORNEAL SURGICAL SCARRING: OD_SCARRING: STROMAL

## 2025-07-16 ENCOUNTER — OFFICE (OUTPATIENT)
Dept: URBAN - METROPOLITAN AREA CLINIC 104 | Facility: CLINIC | Age: 85
Setting detail: OPHTHALMOLOGY
End: 2025-07-16
Payer: MEDICARE

## 2025-07-16 ENCOUNTER — RX ONLY (RX ONLY)
Age: 85
End: 2025-07-16

## 2025-07-16 DIAGNOSIS — Z96.1: ICD-10-CM

## 2025-07-16 PROCEDURE — 99024 POSTOP FOLLOW-UP VISIT: CPT | Performed by: OPTOMETRIST

## 2025-07-16 ASSESSMENT — KERATOMETRY
OS_AXISANGLE_DEGREES: 050
OD_K2POWER_DIOPTERS: 46.81
OS_K2POWER_DIOPTERS: 44.00
OD_K1POWER_DIOPTERS: 41.89
OD_AXISANGLE_DEGREES: 020
OS_K1POWER_DIOPTERS: 43.21

## 2025-07-16 ASSESSMENT — REFRACTION_MANIFEST
OS_AXIS: 120
OS_VA1: 20/50
OS_SPHERE: +1.25
OD_AXIS: 100
OS_CYLINDER: -1.50
OD_VA1: 20/40
OD_SPHERE: +1.75
OD_CYLINDER: -2.25

## 2025-07-16 ASSESSMENT — REFRACTION_AUTOREFRACTION
OS_AXIS: 134
OS_SPHERE: +0.75
OD_SPHERE: +1.25
OD_AXIS: 047
OD_CYLINDER: -2.75
OS_CYLINDER: -1.25

## 2025-07-16 ASSESSMENT — DRY EYES - PHYSICIAN NOTES: OD_GENERALCOMMENTS: S NODULES

## 2025-07-16 ASSESSMENT — CONFRONTATIONAL VISUAL FIELD TEST (CVF)
OS_FINDINGS: FULL
OD_FINDINGS: FULL

## 2025-07-16 ASSESSMENT — CORNEAL EDEMA - FOLDS/STRIAE: OD_FOLDSSTRIAE: 2+

## 2025-07-16 ASSESSMENT — VISUAL ACUITY
OS_BCVA: 20/50
OD_BCVA: 20/50

## 2025-07-16 ASSESSMENT — CORNEAL SURGICAL SCARRING: OD_SCARRING: STROMAL

## 2025-07-22 ENCOUNTER — RX ONLY (RX ONLY)
Age: 85
End: 2025-07-22

## 2025-07-22 ENCOUNTER — OFFICE (OUTPATIENT)
Dept: URBAN - METROPOLITAN AREA CLINIC 104 | Facility: CLINIC | Age: 85
Setting detail: OPHTHALMOLOGY
End: 2025-07-22
Payer: MEDICARE

## 2025-07-22 DIAGNOSIS — Z96.1: ICD-10-CM

## 2025-07-22 DIAGNOSIS — H25.12: ICD-10-CM

## 2025-07-22 DIAGNOSIS — H18.513: ICD-10-CM

## 2025-07-22 PROCEDURE — 99024 POSTOP FOLLOW-UP VISIT: CPT | Performed by: SPECIALIST

## 2025-07-22 PROCEDURE — 92136 OPHTHALMIC BIOMETRY: CPT | Mod: 26,LT | Performed by: SPECIALIST

## 2025-07-22 ASSESSMENT — PACHYMETRY
OS_CT_UM: 594
OD_CT_CORRECTION: -5
OS_CT_CORRECTION: -4
OD_CT_UM: 617

## 2025-07-22 ASSESSMENT — KERATOMETRY
OS_K2POWER_DIOPTERS: 43.83
OS_AXISANGLE_DEGREES: 33
OS_K1POWER_DIOPTERS: 42.67

## 2025-07-22 ASSESSMENT — TONOMETRY
OS_IOP_MMHG: 15
OD_IOP_MMHG: 15

## 2025-07-22 ASSESSMENT — VISUAL ACUITY
OD_BCVA: 20/50
OS_BCVA: 20/50

## 2025-07-22 ASSESSMENT — CONFRONTATIONAL VISUAL FIELD TEST (CVF)
OD_FINDINGS: FULL
OS_FINDINGS: FULL

## 2025-07-22 ASSESSMENT — DRY EYES - PHYSICIAN NOTES: OD_GENERALCOMMENTS: S NODULES

## 2025-07-22 ASSESSMENT — REFRACTION_AUTOREFRACTION
OS_AXIS: 131
OD_SPHERE: +0.75
OD_AXIS: 102
OS_SPHERE: +1.25
OD_CYLINDER: -0.50
OS_CYLINDER: -1.00

## 2025-07-22 ASSESSMENT — CORNEAL EDEMA - FOLDS/STRIAE: OD_FOLDSSTRIAE: ABSENT

## 2025-07-22 ASSESSMENT — CORNEAL SURGICAL SCARRING: OD_SCARRING: STROMAL

## 2025-07-25 ENCOUNTER — RX ONLY (RX ONLY)
Age: 85
End: 2025-07-25

## 2025-07-25 ENCOUNTER — ASC (OUTPATIENT)
Dept: URBAN - METROPOLITAN AREA SURGERY 8 | Facility: SURGERY | Age: 85
Setting detail: OPHTHALMOLOGY
End: 2025-07-25
Payer: MEDICARE

## 2025-07-25 DIAGNOSIS — H25.12: ICD-10-CM

## 2025-07-25 DIAGNOSIS — H52.222: ICD-10-CM

## 2025-07-25 PROCEDURE — 68841 INSJ RX ELUT IMPLT LAC CANAL: CPT | Mod: 79,LT | Performed by: SPECIALIST

## 2025-07-25 PROCEDURE — S9986 NOT MEDICALLY NECESSARY SVC: HCPCS | Mod: GX,GY | Performed by: SPECIALIST

## 2025-07-25 PROCEDURE — 66984 XCAPSL CTRC RMVL W/O ECP: CPT | Mod: 79,LT | Performed by: SPECIALIST

## 2025-07-25 PROCEDURE — FEMTO PRECISION LASER CATARACT SURGERY: Mod: GY | Performed by: SPECIALIST

## 2025-07-26 ENCOUNTER — OFFICE (OUTPATIENT)
Dept: URBAN - METROPOLITAN AREA CLINIC 104 | Facility: CLINIC | Age: 85
Setting detail: OPHTHALMOLOGY
End: 2025-07-26
Payer: MEDICARE

## 2025-07-26 DIAGNOSIS — Z96.1: ICD-10-CM

## 2025-07-26 PROCEDURE — 99024 POSTOP FOLLOW-UP VISIT: CPT | Performed by: OPTOMETRIST

## 2025-07-26 ASSESSMENT — VISUAL ACUITY
OS_BCVA: 20/60
OD_BCVA: 20/40

## 2025-07-26 ASSESSMENT — REFRACTION_AUTOREFRACTION
OS_SPHERE: +1.25
OD_AXIS: 102
OD_SPHERE: +0.75
OS_AXIS: 131
OS_CYLINDER: -1.00
OD_CYLINDER: -0.50

## 2025-07-26 ASSESSMENT — CORNEAL SURGICAL SCARRING: OD_SCARRING: STROMAL

## 2025-07-26 ASSESSMENT — KERATOMETRY
OS_K1POWER_DIOPTERS: 42.67
OS_AXISANGLE_DEGREES: 33
OS_K2POWER_DIOPTERS: 43.83

## 2025-07-26 ASSESSMENT — CONFRONTATIONAL VISUAL FIELD TEST (CVF)
OD_FINDINGS: FULL
OS_FINDINGS: FULL

## 2025-07-26 ASSESSMENT — CORNEAL EDEMA - FOLDS/STRIAE: OD_FOLDSSTRIAE: T

## 2025-07-26 ASSESSMENT — DRY EYES - PHYSICIAN NOTES: OD_GENERALCOMMENTS: S NODULES

## 2025-07-30 ENCOUNTER — OFFICE (OUTPATIENT)
Dept: URBAN - METROPOLITAN AREA CLINIC 114 | Facility: CLINIC | Age: 85
Setting detail: OPHTHALMOLOGY
End: 2025-07-30
Payer: MEDICARE

## 2025-07-30 ENCOUNTER — RX ONLY (RX ONLY)
Age: 85
End: 2025-07-30

## 2025-07-30 DIAGNOSIS — Z96.1: ICD-10-CM

## 2025-07-30 DIAGNOSIS — H18.513: ICD-10-CM

## 2025-07-30 PROBLEM — H16.221 DRY EYE SYNDROME K SICCA; RIGHT EYE: Status: ACTIVE | Noted: 2025-07-30

## 2025-07-30 PROCEDURE — 99024 POSTOP FOLLOW-UP VISIT: CPT | Performed by: SPECIALIST

## 2025-07-30 ASSESSMENT — REFRACTION_AUTOREFRACTION
OD_CYLINDER: -1.00
OS_SPHERE: +0.75
OD_SPHERE: +0.75
OS_AXIS: 124
OD_AXIS: 088
OS_CYLINDER: -1.00

## 2025-07-30 ASSESSMENT — CORNEAL SURGICAL SCARRING: OD_SCARRING: STROMAL

## 2025-07-30 ASSESSMENT — SUPERFICIAL PUNCTATE KERATITIS (SPK): OD_SPK: 2+

## 2025-07-30 ASSESSMENT — CONFRONTATIONAL VISUAL FIELD TEST (CVF)
OS_FINDINGS: FULL
OD_FINDINGS: FULL

## 2025-07-30 ASSESSMENT — PACHYMETRY
OD_CT_CORRECTION: -5
OS_CT_CORRECTION: -4
OS_CT_UM: 594
OD_CT_UM: 617

## 2025-07-30 ASSESSMENT — VISUAL ACUITY
OD_BCVA: 20/30
OS_BCVA: 20/100

## 2025-07-30 ASSESSMENT — TONOMETRY
OD_IOP_MMHG: 18
OS_IOP_MMHG: 18

## 2025-08-06 ENCOUNTER — OFFICE (OUTPATIENT)
Dept: URBAN - METROPOLITAN AREA CLINIC 114 | Facility: CLINIC | Age: 85
Setting detail: OPHTHALMOLOGY
End: 2025-08-06
Payer: MEDICARE

## 2025-08-06 DIAGNOSIS — Z96.1: ICD-10-CM

## 2025-08-06 DIAGNOSIS — H18.513: ICD-10-CM

## 2025-08-06 PROCEDURE — 99024 POSTOP FOLLOW-UP VISIT: CPT | Performed by: SPECIALIST

## 2025-08-06 ASSESSMENT — VISUAL ACUITY
OS_BCVA: 20/30
OD_BCVA: 20/60

## 2025-08-06 ASSESSMENT — PACHYMETRY
OS_CT_UM: 594
OD_CT_CORRECTION: -5
OD_CT_UM: 617
OS_CT_CORRECTION: -4

## 2025-08-06 ASSESSMENT — CONFRONTATIONAL VISUAL FIELD TEST (CVF)
OS_FINDINGS: FULL
OD_FINDINGS: FULL

## 2025-08-06 ASSESSMENT — CORNEAL SURGICAL SCARRING: OD_SCARRING: STROMAL

## 2025-08-06 ASSESSMENT — TONOMETRY
OD_IOP_MMHG: 14
OS_IOP_MMHG: 14

## 2025-08-27 ENCOUNTER — OFFICE (OUTPATIENT)
Dept: URBAN - METROPOLITAN AREA CLINIC 114 | Facility: CLINIC | Age: 85
Setting detail: OPHTHALMOLOGY
End: 2025-08-27
Payer: MEDICARE

## 2025-08-27 ENCOUNTER — RX ONLY (RX ONLY)
Age: 85
End: 2025-08-27

## 2025-08-27 DIAGNOSIS — H17.821: ICD-10-CM

## 2025-08-27 DIAGNOSIS — H18.513: ICD-10-CM

## 2025-08-27 DIAGNOSIS — Z96.1: ICD-10-CM

## 2025-08-27 DIAGNOSIS — H16.221: ICD-10-CM

## 2025-08-27 PROCEDURE — 99024 POSTOP FOLLOW-UP VISIT: CPT | Performed by: SPECIALIST

## 2025-08-27 ASSESSMENT — REFRACTION_AUTOREFRACTION
OS_CYLINDER: -1.25
OD_SPHERE: +1.00
OD_AXIS: 085
OD_CYLINDER: -0.75
OS_SPHERE: +1.25
OS_AXIS: 152

## 2025-08-27 ASSESSMENT — VISUAL ACUITY
OD_BCVA: 20/50+1
OS_BCVA: 20/40+1

## 2025-08-27 ASSESSMENT — KERATOMETRY
OD_K1POWER_DIOPTERS: 42.50
OD_AXISANGLE_DEGREES: 004
OS_K2POWER_DIOPTERS: 44.50
OS_K1POWER_DIOPTERS: 43.25
OS_AXISANGLE_DEGREES: 072
OD_K2POWER_DIOPTERS: 44.50

## 2025-08-27 ASSESSMENT — CORNEAL SURGICAL SCARRING: OD_SCARRING: STROMAL

## 2025-08-27 ASSESSMENT — TONOMETRY
OS_IOP_MMHG: 14
OD_IOP_MMHG: 14

## 2025-08-27 ASSESSMENT — REFRACTION_MANIFEST
OS_VA1: 20/40
OS_SPHERE: +0.75
OD_VA1: 20/25
OS_AXIS: 150
OS_AXIS: 115
OD_SPHERE: +1.75
OD_AXIS: 080
OS_SPHERE: +0.75
OD_CYLINDER: -0.75
OS_CYLINDER: -1.25
OS_VA1: 20/30
OS_CYLINDER: -1.00

## 2025-08-27 ASSESSMENT — PACHYMETRY
OS_CT_UM: 594
OD_CT_CORRECTION: -5
OD_CT_UM: 617
OS_CT_CORRECTION: -4

## 2025-08-27 ASSESSMENT — CONFRONTATIONAL VISUAL FIELD TEST (CVF)
OD_FINDINGS: FULL
OS_FINDINGS: FULL

## 2025-09-17 ENCOUNTER — APPOINTMENT (OUTPATIENT)
Dept: INTERNAL MEDICINE | Facility: CLINIC | Age: 85
End: 2025-09-17
Payer: MEDICARE

## 2025-09-17 ENCOUNTER — LABORATORY RESULT (OUTPATIENT)
Age: 85
End: 2025-09-17

## 2025-09-17 VITALS
WEIGHT: 175 LBS | BODY MASS INDEX: 25.92 KG/M2 | SYSTOLIC BLOOD PRESSURE: 130 MMHG | HEIGHT: 69 IN | DIASTOLIC BLOOD PRESSURE: 60 MMHG

## 2025-09-17 DIAGNOSIS — I48.91 UNSPECIFIED ATRIAL FIBRILLATION: ICD-10-CM

## 2025-09-17 DIAGNOSIS — R19.00 INTRA-ABDOMINAL AND PELVIC SWELLING, MASS AND LUMP, UNSPECIFIED SITE: ICD-10-CM

## 2025-09-17 PROCEDURE — 36415 COLL VENOUS BLD VENIPUNCTURE: CPT

## 2025-09-17 PROCEDURE — 99215 OFFICE O/P EST HI 40 MIN: CPT

## 2025-09-19 DIAGNOSIS — R79.89 OTHER SPECIFIED ABNORMAL FINDINGS OF BLOOD CHEMISTRY: ICD-10-CM

## 2025-09-19 LAB
ALBUMIN SERPL ELPH-MCNC: 4.3 G/DL
ALP BLD-CCNC: 64 U/L
ALT SERPL-CCNC: 37 U/L
ANION GAP SERPL CALC-SCNC: 13 MMOL/L
AST SERPL-CCNC: 33 U/L
BASOPHILS # BLD AUTO: 0.06 K/UL
BASOPHILS NFR BLD AUTO: 0.4 %
BILIRUB SERPL-MCNC: 0.8 MG/DL
BUN SERPL-MCNC: 35 MG/DL
CALCIUM SERPL-MCNC: 9.2 MG/DL
CHLORIDE SERPL-SCNC: 99 MMOL/L
CO2 SERPL-SCNC: 24 MMOL/L
CREAT SERPL-MCNC: 1.7 MG/DL
EGFRCR SERPLBLD CKD-EPI 2021: 40 ML/MIN/1.73M2
EOSINOPHIL # BLD AUTO: 0.24 K/UL
EOSINOPHIL NFR BLD AUTO: 1.8 %
GLUCOSE SERPL-MCNC: 85 MG/DL
HCT VFR BLD CALC: 37.7 %
HGB BLD-MCNC: 12 G/DL
IMM GRANULOCYTES NFR BLD AUTO: 0.5 %
LYMPHOCYTES # BLD AUTO: 3.12 K/UL
LYMPHOCYTES NFR BLD AUTO: 23.2 %
MAN DIFF?: NORMAL
MCHC RBC-ENTMCNC: 31.8 G/DL
MCHC RBC-ENTMCNC: 31.8 PG
MCV RBC AUTO: 100 FL
MONOCYTES # BLD AUTO: 1.97 K/UL
MONOCYTES NFR BLD AUTO: 14.6 %
NEUTROPHILS # BLD AUTO: 8.01 K/UL
NEUTROPHILS NFR BLD AUTO: 59.5 %
PLATELET # BLD AUTO: 262 K/UL
POTASSIUM SERPL-SCNC: 5 MMOL/L
PROT SERPL-MCNC: 7.1 G/DL
RBC # BLD: 3.77 M/UL
RBC # FLD: 15.6 %
SODIUM SERPL-SCNC: 136 MMOL/L
WBC # FLD AUTO: 13.47 K/UL